# Patient Record
Sex: FEMALE | Race: WHITE | NOT HISPANIC OR LATINO | Employment: FULL TIME | URBAN - METROPOLITAN AREA
[De-identification: names, ages, dates, MRNs, and addresses within clinical notes are randomized per-mention and may not be internally consistent; named-entity substitution may affect disease eponyms.]

---

## 2017-01-10 ENCOUNTER — ALLSCRIPTS OFFICE VISIT (OUTPATIENT)
Dept: OTHER | Facility: OTHER | Age: 52
End: 2017-01-10

## 2017-01-20 ENCOUNTER — ALLSCRIPTS OFFICE VISIT (OUTPATIENT)
Dept: OTHER | Facility: OTHER | Age: 52
End: 2017-01-20

## 2017-03-31 ENCOUNTER — ALLSCRIPTS OFFICE VISIT (OUTPATIENT)
Dept: OTHER | Facility: OTHER | Age: 52
End: 2017-03-31

## 2017-03-31 DIAGNOSIS — N95.0 POSTMENOPAUSAL BLEEDING: ICD-10-CM

## 2017-04-04 ENCOUNTER — HOSPITAL ENCOUNTER (OUTPATIENT)
Dept: RADIOLOGY | Facility: HOSPITAL | Age: 52
Discharge: HOME | End: 2017-04-04
Payer: COMMERCIAL

## 2017-04-04 DIAGNOSIS — N95.0 POSTMENOPAUSAL BLEEDING: ICD-10-CM

## 2017-04-04 PROCEDURE — 76856 US EXAM PELVIC COMPLETE: CPT

## 2017-04-04 PROCEDURE — 76830 TRANSVAGINAL US NON-OB: CPT

## 2017-04-12 ENCOUNTER — GENERIC CONVERSION - ENCOUNTER (OUTPATIENT)
Dept: OTHER | Facility: OTHER | Age: 52
End: 2017-04-12

## 2017-04-28 ENCOUNTER — ALLSCRIPTS OFFICE VISIT (OUTPATIENT)
Dept: OTHER | Facility: OTHER | Age: 52
End: 2017-04-28

## 2017-04-28 ENCOUNTER — LAB REQUISITION (OUTPATIENT)
Dept: LAB | Facility: HOSPITAL | Age: 52
End: 2017-04-28
Payer: COMMERCIAL

## 2017-04-28 DIAGNOSIS — N95.0 POSTMENOPAUSAL BLEEDING: ICD-10-CM

## 2017-04-28 PROCEDURE — 88305 TISSUE EXAM BY PATHOLOGIST: CPT | Performed by: FAMILY MEDICINE

## 2017-05-05 ENCOUNTER — ALLSCRIPTS OFFICE VISIT (OUTPATIENT)
Dept: OTHER | Facility: OTHER | Age: 52
End: 2017-05-05

## 2017-09-21 ENCOUNTER — ALLSCRIPTS OFFICE VISIT (OUTPATIENT)
Dept: OTHER | Facility: OTHER | Age: 52
End: 2017-09-21

## 2018-01-12 VITALS
RESPIRATION RATE: 16 BRPM | WEIGHT: 183.4 LBS | DIASTOLIC BLOOD PRESSURE: 80 MMHG | SYSTOLIC BLOOD PRESSURE: 130 MMHG | TEMPERATURE: 97.4 F | HEIGHT: 64 IN | BODY MASS INDEX: 31.31 KG/M2 | HEART RATE: 88 BPM

## 2018-01-12 VITALS
HEIGHT: 64 IN | RESPIRATION RATE: 16 BRPM | TEMPERATURE: 97.3 F | WEIGHT: 187.38 LBS | HEART RATE: 88 BPM | SYSTOLIC BLOOD PRESSURE: 128 MMHG | BODY MASS INDEX: 31.99 KG/M2 | DIASTOLIC BLOOD PRESSURE: 80 MMHG

## 2018-01-12 VITALS
BODY MASS INDEX: 30.8 KG/M2 | DIASTOLIC BLOOD PRESSURE: 88 MMHG | HEIGHT: 64 IN | SYSTOLIC BLOOD PRESSURE: 132 MMHG | WEIGHT: 180.4 LBS

## 2018-01-12 NOTE — RESULT NOTES
Verified Results  * US PELVIS COMPLETE (TRANSABDOMINAL AND TRANSVAGINAL) [id number] 12:18PM Claude Halt Order Number: [id number]    - Patient Instructions: To schedule this appointment, please contact Central Scheduling at [phone]  Test Name Result Flag Reference   US PELVIS COMPLETE (TRANSABDOMINAL AND TRANSVAGINAL) (Report)     PELVIC ULTRASOUND, COMPLETE     INDICATION: Postmenopausal bleeding  Patient is postmenopausal 2 years ago      COMPARISON: None  TECHNIQUE:  Transabdominal pelvic ultrasound was performed in sagittal and transverse planes with a curvilinear transducer  Additional transvaginal imaging was performed to better evaluate the endometrium and ovaries  Imaging included volumetric    sweeps as well as traditional still imaging technique  FINDINGS:     UTERUS:   The uterus is normal in position, measuring 6 9 x 3 4 x 4 3 cm  Contour and echotexture appear normal  A simple nabothian cyst is present  The cervix shows no suspicious abnormality  A fundal fibroid measures 1 3 x 1 2 x 2 1 cm  ENDOMETRIUM:    The endometrium is irregular and thickened measuring 7 mm  It is vascular  Homogenous and normal in appearance  OVARIES/ADNEXA:   Right ovary: 2 3 x 1 3 x 2 0 cm  cm  No suspicious right ovarian abnormality  Dominant simple benign appearing follicle noted  Doppler flow within normal limits  Left ovary: 2 4 x 1 0 x 1 5 cm  No suspicious left ovarian abnormality  Dominant simple benign appearing follicle noted  Doppler flow within normal limits  No suspicious adnexal mass or loculated collections  There is no free fluid  IMPRESSION:      Thickened heterogeneous and vascular endometrium  Endometrial biopsy recommended  A fibroid is present  Bilateral adnexal follicles          ##sigslh##sigslh       Workstation performed: [id number]     Signed by:   Christopher Looney MD   4/5/17

## 2018-01-13 VITALS
HEART RATE: 88 BPM | DIASTOLIC BLOOD PRESSURE: 80 MMHG | WEIGHT: 186 LBS | SYSTOLIC BLOOD PRESSURE: 130 MMHG | HEIGHT: 64 IN | TEMPERATURE: 97.1 F | BODY MASS INDEX: 31.76 KG/M2 | RESPIRATION RATE: 16 BRPM

## 2018-01-13 VITALS
WEIGHT: 179.13 LBS | SYSTOLIC BLOOD PRESSURE: 129 MMHG | HEART RATE: 102 BPM | BODY MASS INDEX: 30.75 KG/M2 | DIASTOLIC BLOOD PRESSURE: 84 MMHG

## 2018-01-13 VITALS
HEART RATE: 67 BPM | BODY MASS INDEX: 31.41 KG/M2 | SYSTOLIC BLOOD PRESSURE: 124 MMHG | WEIGHT: 183 LBS | DIASTOLIC BLOOD PRESSURE: 82 MMHG

## 2018-01-13 NOTE — MISCELLANEOUS
Message   Recorded as Task   Date: 04/12/2017 08:52 AM, Created By: Madalyn Umanzor   Task Name: Follow Up   Assigned To: Jesse Tao   Regarding Patient: Candice Romo, Status: Active   Comment:    Madalyn Umanzor - 12 Apr 2017 8:52 AM     TASK CREATED  please  verify pelvic u s    pt is calling for results of u s & recommendation is to have an endo  bx  please advise  thank you Eyal Brewster - 12 Apr 2017 2:38 PM     TASK REPLIED TO: Previously Assigned To Colton Bud  Verified results  Please call patient to schedule appointment for endometrial biopsy  Thank you  Jesse Tao - 12 Apr 2017 2:47 PM     TASK EDITED  called pt-notified of recommendation - apt scheduled for endometrial bx on 4 /28/17 at 1015 am   pt verbalized understanding  Active Problems    1  Benign hypertension (401 1) (I10)   2  Depression (311) (F32 9)   3  Encounter for screening for malignant neoplasm of colon (V76 51) (Z12 11)   4  Encounter for screening mammogram for malignant neoplasm of breast (V76 12)   (Z12 31)   5  Hyperlipidemia (272 4) (E78 5)   6  Insomnia (780 52) (G47 00)   7  Other specified anxiety disorders (300 09) (F41 8)   8  Postmenopausal bleeding (627 1) (N95 0)   9  Vitamin D deficiency (268 9) (E55 9)   10  Well adult on routine health check (V70 0) (Z00 00)    Current Meds   1  ALPRAZolam 0 5 MG Oral Tablet; ONE TAB PO BID PRN; Therapy: 61WKH6591 to (Last Rx:55Okz5148)  Requested for: 31Nhk9249 Ordered   2  BuPROPion HCl ER (XL) 300 MG Oral Tablet Extended Release 24 Hour; Therapy: 35OOV3140 to Recorded   3  HydroCHLOROthiazide 12 5 MG Oral Tablet; 1 every day; Therapy: 74DEM8258 to (Last OO:05ADT5849)  Requested for: 82GNR2965 Ordered   4  Lexapro 10 MG Oral Tablet (Escitalopram Oxalate); Therapy: 60MCH2968 to Recorded   5  Metoprolol Succinate ER 25 MG Oral Tablet Extended Release 24 Hour; 1 every day; Therapy: 27FHS3253 to (Last Rx:08Mar2017)  Requested for: 73KSC8364 Ordered   6  Ondansetron HCl - 4 MG Oral Tablet; 1 tablet every 4 hours as needed; Therapy: 80UUA9964 to (Last Rx:20Jan2017)  Requested for: 20Jan2017 Ordered   7  Vytorin 10-20 MG Oral Tablet; Take 1 tablet daily; Therapy: 21Twt2169 to (Last Rx:35Iyh5560)  Requested for: 74Vyp3682 Ordered    Allergies    1   PREDNISONE    Signatures   Electronically signed by : Keyla Cisneros RN; Apr 12 2017  2:47PM EST                       (Author)

## 2018-01-16 NOTE — RESULT NOTES
Verified Results  (1) COMPREHENSIVE METABOLIC PANEL [id number] 32:13ZH [facility]     Test Name Result Flag Reference   GLUCOSE 84 mg/dL  65-99   Fasting reference interval   UREA NITROGEN (BUN) 11 mg/dL  7-25   CREATININE 1 08 mg/dL H 0 50-1 05   For patients >52years of age, the reference limit  for Creatinine is approximately 13% higher for people  identified as -American  eGFR NON-AFR  AMERICAN 59 mL/min/1 73m2 L > OR = 60   eGFR AFRICAN AMERICAN 68 mL/min/1 73m2  > OR = 60   BUN/CREATININE RATIO 10 (calc)  6-22   SODIUM 141 mmol/L  135-146   POTASSIUM 4 6 mmol/L  3 5-5 3   CHLORIDE 106 mmol/L     CARBON DIOXIDE 24 mmol/L  20-31   CALCIUM 9 6 mg/dL  8 6-10 4   PROTEIN, TOTAL 7 2 g/dL  6 1-8 1   ALBUMIN 4 4 g/dL  3 6-5 1   GLOBULIN 2 8 g/dL (calc)  1 9-3 7   ALBUMIN/GLOBULIN RATIO 1 6 (calc)  1 0-2 5   BILIRUBIN, TOTAL 0 4 mg/dL  0 2-1 2   ALKALINE PHOSPHATASE 103 U/L     AST 22 U/L  10-35   ALT 17 U/L  6-29     (1) LIPID PANEL, FASTING 93Lsv3020 09:50AM [facility]     Test Name Result Flag Reference   CHOLESTEROL, TOTAL 224 mg/dL H 125-200   HDL CHOLESTEROL 41 mg/dL L > OR = 46   TRIGLICERIDES 382 mg/dL H <150   LDL-CHOLESTEROL 135 mg/dL (calc) H <130   Desirable range <100 mg/dL for patients with CHD or  diabetes and <70 mg/dL for diabetic patients with  known heart disease  CHOL/HDLC RATIO 5 5 (calc) H < OR = 5 0   NON HDL CHOLESTEROL 183 mg/dL (calc) H    Target for non-HDL cholesterol is 30 mg/dL higher than   LDL cholesterol target

## 2018-01-16 NOTE — MISCELLANEOUS
Message  pt called office -explained to pt awaiting dr Emily Coates to verify u s  report, then will call with f/u recommendation  Active Problems    1  Benign hypertension (401 1) (I10)   2  Depression (311) (F32 9)   3  Encounter for screening for malignant neoplasm of colon (V76 51) (Z12 11)   4  Encounter for screening mammogram for malignant neoplasm of breast (V76 12)   (Z12 31)   5  Hyperlipidemia (272 4) (E78 5)   6  Insomnia (780 52) (G47 00)   7  Other specified anxiety disorders (300 09) (F41 8)   8  Postmenopausal bleeding (627 1) (N95 0)   9  Vitamin D deficiency (268 9) (E55 9)   10  Well adult on routine health check (V70 0) (Z00 00)    Current Meds   1  ALPRAZolam 0 5 MG Oral Tablet; ONE TAB PO BID PRN; Therapy: 35UDQ9633 to (Last Rx:34Vff9631)  Requested for: 50Jrw3905 Ordered   2  BuPROPion HCl ER (XL) 300 MG Oral Tablet Extended Release 24 Hour; Therapy: 81NWK2440 to Recorded   3  HydroCHLOROthiazide 12 5 MG Oral Tablet; 1 every day; Therapy: 06ELC5655 to (Last RH:04ABV3608)  Requested for: 21XST7916 Ordered   4  Lexapro 10 MG Oral Tablet (Escitalopram Oxalate); Therapy: 85DOO4604 to Recorded   5  Metoprolol Succinate ER 25 MG Oral Tablet Extended Release 24 Hour; 1 every day; Therapy: 69ZBG4885 to (Last Rx:08Mar2017)  Requested for: 01NSY3576 Ordered   6  Ondansetron HCl - 4 MG Oral Tablet; 1 tablet every 4 hours as needed; Therapy: 39MSI2329 to (Last Rx:20Jan2017)  Requested for: 20Jan2017 Ordered   7  Vytorin 10-20 MG Oral Tablet; Take 1 tablet daily; Therapy: 69Dut5818 to (Last Rx:21Gfr5369)  Requested for: 29Fsn0033 Ordered    Allergies    1   PREDNISONE    Signatures   Electronically signed by : Alexis Graham RN; Apr 12 2017 10:09AM EST                       (Author)

## 2018-03-05 ENCOUNTER — OFFICE VISIT (OUTPATIENT)
Dept: URGENT CARE | Facility: CLINIC | Age: 53
End: 2018-03-05
Payer: COMMERCIAL

## 2018-03-05 VITALS
HEART RATE: 73 BPM | OXYGEN SATURATION: 98 % | BODY MASS INDEX: 29.89 KG/M2 | TEMPERATURE: 98 F | DIASTOLIC BLOOD PRESSURE: 70 MMHG | RESPIRATION RATE: 16 BRPM | WEIGHT: 186 LBS | HEIGHT: 66 IN | SYSTOLIC BLOOD PRESSURE: 108 MMHG

## 2018-03-05 DIAGNOSIS — J06.9 ACUTE URI: Primary | ICD-10-CM

## 2018-03-05 PROCEDURE — 99213 OFFICE O/P EST LOW 20 MIN: CPT | Performed by: PHYSICIAN ASSISTANT

## 2018-03-05 RX ORDER — BUPROPION HYDROCHLORIDE 300 MG/1
300 TABLET ORAL EVERY MORNING
COMMUNITY
Start: 2015-01-12 | End: 2020-02-26 | Stop reason: SDUPTHER

## 2018-03-05 RX ORDER — EZETIMIBE AND SIMVASTATIN 10; 20 MG/1; MG/1
1 TABLET ORAL DAILY
COMMUNITY
Start: 2016-08-30 | End: 2018-05-07 | Stop reason: SDUPTHER

## 2018-03-05 RX ORDER — METOPROLOL SUCCINATE 25 MG/1
25 TABLET, EXTENDED RELEASE ORAL
COMMUNITY
Start: 2016-01-05 | End: 2018-12-21 | Stop reason: SDUPTHER

## 2018-03-05 RX ORDER — ALPRAZOLAM 0.5 MG/1
1 TABLET ORAL 2 TIMES DAILY PRN
COMMUNITY
Start: 2012-10-23 | End: 2018-08-20 | Stop reason: SDUPTHER

## 2018-03-05 RX ORDER — ESCITALOPRAM OXALATE 10 MG/1
10 TABLET ORAL
COMMUNITY
Start: 2015-01-12 | End: 2020-02-26

## 2018-03-05 NOTE — PROGRESS NOTES
[facility]        NAME: Julian Bragg is a 45 y o  female  : 1965    MRN: [id number]  DATE: 2018  TIME: 9:24 AM    Assessment and Plan   Acute URI [J06 9]  1  Acute URI           Patient Instructions   Acute Upper Respiratory Tract Infection:   -There is no sign of bacterial infection on exam at this time  This is likely a viral illness  Advised supportive measures  -You can use OTC zyrtec or claritin  You can OTC mucinex  -Use a humidifier next to your bed  Take steam showers  -You can take Advil or Tylenol for fever or pain  -Stay very well hydrated and rest   -If your symptoms persist or worsen follow up immediately with your PCP      Follow up with PCP in 3-5 days  Proceed to  ER if symptoms worsen  Chief Complaint     Chief Complaint   Patient presents with    Cold Like Symptoms     bodyaches, fatigue,nausea,decreased appetite; onset Saturday 3/3/18         History of Present Illness       The patient presents today for a two day hx of fatigue, myalgias, chills  She reports that her symptoms started with fatigue and then progressed to myalgias and chills  She did not take her temperature  She also notes that she is experiencing nausea and decreased appetite  She states that she is now having PND and rhinorrhea  She has been tolerating liquid PO intake  She has been taking tylenol for her symptoms with little relief  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  She did not have her flu vaccine this year  She denies vomiting, diarrhea, abdominal pain, cough, dyspnea, wheezing, pharyngitis  Review of Systems   Review of Systems   Constitutional: Positive for appetite change, chills and fatigue  Negative for fever  HENT: Positive for postnasal drip and rhinorrhea  Negative for congestion, ear discharge, ear pain, sinus pain, sinus pressure, sore throat, tinnitus, trouble swallowing and voice change      Respiratory: Negative for cough, chest tightness, shortness of breath and wheezing  Cardiovascular: Negative for chest pain, palpitations and leg swelling  Gastrointestinal: Positive for nausea  Negative for abdominal distention, abdominal pain, anal bleeding, blood in stool, constipation, diarrhea, rectal pain and vomiting  Musculoskeletal: Positive for myalgias  Negative for neck pain and neck stiffness  Neurological: Negative for dizziness, light-headedness and headaches  Hematological: Negative for adenopathy  Does not bruise/bleed easily  Current Medications       Current Outpatient Prescriptions:     ALPRAZolam (XANAX) 0 5 mg tablet, Take 1 tablet by mouth 2 (two) times a day as needed, Disp: , Rfl:     buPROPion (WELLBUTRIN XL) 300 mg 24 hr tablet, Take by mouth, Disp: , Rfl:     escitalopram (LEXAPRO) 10 mg tablet, Take by mouth, Disp: , Rfl:     ezetimibe-simvastatin (VYTORIN) 10-20 mg per tablet, Take 1 tablet by mouth daily, Disp: , Rfl:     metoprolol succinate (TOPROL-XL) 25 mg 24 hr tablet, Take by mouth daily, Disp: , Rfl:     Current Allergies     Allergies as of 03/05/2018 - Reviewed 03/05/2018   Allergen Reaction Noted    Prednisone Anxiety 02/14/2012            The following portions of the patient's history were reviewed and updated as appropriate: allergies, current medications, past family history, past medical history, past social history, past surgical history and problem list      Past Medical History:   Diagnosis Date    Depression     Hyperlipidemia     Hypertension        History reviewed  No pertinent surgical history  Family History   Problem Relation Age of Onset    No Known Problems Mother     No Known Problems Father          Medications have been verified  Objective   /70   Pulse 73   Temp 98 °F (36 7 °C)   Resp 16   Ht 5' 5 5" (1 664 m)   Wt 84 4 kg (186 lb)   SpO2 98%   Breastfeeding?  No   BMI 30 48 kg/m²        Physical Exam     Physical Exam   Constitutional: She is oriented to person, place, and time  She appears well-developed and well-nourished  No distress  HENT:   Right Ear: Hearing, tympanic membrane, external ear and ear canal normal    Left Ear: Hearing, tympanic membrane, external ear and ear canal normal    Nose: Nose normal    Mouth/Throat: Uvula is midline, oropharynx is clear and moist and mucous membranes are normal  No oropharyngeal exudate, posterior oropharyngeal edema, posterior oropharyngeal erythema or tonsillar abscesses  Neck: Normal range of motion  Neck supple  Cardiovascular: Normal rate, regular rhythm and normal heart sounds  Exam reveals no gallop and no friction rub  No murmur heard  Pulmonary/Chest: Effort normal and breath sounds normal  No respiratory distress  She has no wheezes  She has no rales  She exhibits no tenderness  Abdominal: Soft  Bowel sounds are normal  There is no tenderness  Lymphadenopathy:     She has no cervical adenopathy  Neurological: She is alert and oriented to person, place, and time  Psychiatric: She has a normal mood and affect

## 2018-03-05 NOTE — PATIENT INSTRUCTIONS
Upper Respiratory Infection   AMBULATORY CARE:   An upper respiratory infection  is also called a common cold  It can affect your nose, throat, ears, and sinuses  Common signs and symptoms include the following:  Cold symptoms are usually worst for the first 3 to 5 days  You may have any of the following:  · Runny or stuffy nose    · Sneezing and coughing    · Sore throat or hoarseness    · Red, watery, and sore eyes    · Fatigue     · Chills and fever    · Headache, body aches, or sore muscles  Seek care immediately if:   · You have chest pain or trouble breathing  Contact your healthcare provider if:   · You have a fever over 102ºF (39°C)  · Your sore throat gets worse or you see white or yellow spots in your throat  · Your symptoms get worse after 3 to 5 days or your cold is not better in 14 days  · You have a rash anywhere on your skin  · You have large, tender lumps in your neck  · You have thick, green or yellow drainage from your nose  · You cough up thick yellow, green, or bloody mucus  · You have vomiting for more than 24 hours and cannot keep fluids down  · You have a bad earache  · You have questions or concerns about your condition or care  Treatment for a cold: There is no cure for the common cold  Colds are caused by viruses and do not get better with antibiotics  Most people get better in 7 to 14 days  You may continue to cough for 2 to 3 weeks  The following may help decrease your symptoms:  · Decongestants  help reduce nasal congestion and help you breathe more easily  If you take decongestant pills, they may make you feel restless or not able to sleep  Do not use decongestant sprays for more than a few days  · Cough suppressants  help reduce coughing  Ask your healthcare provider which type of cough medicine is best for you  · NSAIDs , such as ibuprofen, help decrease swelling, pain, and fever   NSAIDs can cause stomach bleeding or kidney problems in certain people  If you take blood thinner medicine, always ask your healthcare provider if NSAIDs are safe for you  Always read the medicine label and follow directions  · Acetaminophen  decreases pain and fever  It is available without a doctor's order  Ask how much to take and how often to take it  Follow directions  Read the labels of all other medicines you are using to see if they also contain acetaminophen, or ask your doctor or pharmacist  Acetaminophen can cause liver damage if not taken correctly  Do not use more than 4 grams (4,000 milligrams) total of acetaminophen in one day  Manage your cold:   · Rest as much as possible  Slowly start to do more each day  · Drink more liquids as directed  Liquids will help thin and loosen mucus so you can cough it up  Liquids will also help prevent dehydration  Liquids that help prevent dehydration include water, fruit juice, and broth  Do not drink liquids that contain caffeine  Caffeine can increase your risk for dehydration  Ask your healthcare provider how much liquid to drink each day  · Soothe a sore throat  Gargle with warm salt water  This helps your sore throat feel better  Make salt water by dissolving ¼ teaspoon salt in 1 cup warm water  You may also suck on hard candy or throat lozenges  You may use a sore throat spray  · Use a humidifier or vaporizer  Use a cool mist humidifier or a vaporizer to increase air moisture in your home  This may make it easier for you to breathe and help decrease your cough  · Use saline nasal drops as directed  These help relieve congestion  · Apply petroleum-based jelly around the outside of your nostrils  This can decrease irritation from blowing your nose  · Do not smoke  Nicotine and other chemicals in cigarettes and cigars can make your symptoms worse  They can also cause infections such as bronchitis or pneumonia   Ask your healthcare provider for information if you currently smoke and need help to quit  E-cigarettes or smokeless tobacco still contain nicotine  Talk to your healthcare provider before you use these products  Prevent spreading your cold to others:   · Try to stay away from other people during the first 2 to 3 days of your cold when it is more easily spread  · Do not share food or drinks  · Do not share hand towels with household members  · Wash your hands often, especially after you blow your nose  Turn away from other people and cover your mouth and nose with a tissue when you sneeze or cough  Follow up with your healthcare provider as directed:  Write down your questions so you remember to ask them during your visits  © 2017 [employer]  Information is for End User's use only and may not be sold, redistributed or otherwise used for commercial purposes  All illustrations and images included in CareNotes® are the copyrighted property of [employer]  or [employer] Fu[employer]  The above information is an  only  It is not intended as medical advice for individual conditions or treatments  Talk to your doctor, nurse or pharmacist before following any medical regimen to see if it is safe and effective for you  Acute Upper Respiratory Tract Infection:   -There is no sign of bacterial infection on exam at this time  This is likely a viral illness  Advised supportive measures  -You can use OTC zyrtec or claritin  You can OTC mucinex  -Use a humidifier next to your bed  Take steam showers     -You can take Advil or Tylenol for fever or pain  -Stay very well hydrated and rest   -If your symptoms persist or worsen follow up immediately with your PCP

## 2018-03-05 NOTE — LETTER
March 5, 2018     Patient: Shala Alexis   YOB: 1965   Date of Visit: 3/5/2018       To Whom It May Concern: It is my medical opinion that Shala Alexis was seen in my office on 3/5/2018  She will need to be excused from work 3/4/18-3/6/18 and may return to work on 3/7/18  If you have any questions or concerns, please don't hesitate to call           Sincerely,        Garett Ramirez PA-C    CC: Shala Alexis

## 2018-05-04 ENCOUNTER — OFFICE VISIT (OUTPATIENT)
Dept: FAMILY MEDICINE CLINIC | Facility: CLINIC | Age: 53
End: 2018-05-04
Payer: COMMERCIAL

## 2018-05-04 VITALS
SYSTOLIC BLOOD PRESSURE: 125 MMHG | BODY MASS INDEX: 30.37 KG/M2 | HEIGHT: 66 IN | HEART RATE: 88 BPM | TEMPERATURE: 98.2 F | RESPIRATION RATE: 16 BRPM | WEIGHT: 189 LBS | DIASTOLIC BLOOD PRESSURE: 90 MMHG

## 2018-05-04 DIAGNOSIS — Z12.39 SCREENING FOR BREAST CANCER: ICD-10-CM

## 2018-05-04 DIAGNOSIS — E55.9 VITAMIN D DEFICIENCY: ICD-10-CM

## 2018-05-04 DIAGNOSIS — I10 BENIGN HYPERTENSION: ICD-10-CM

## 2018-05-04 DIAGNOSIS — Z12.11 SCREENING FOR COLON CANCER: ICD-10-CM

## 2018-05-04 DIAGNOSIS — H26.9 CATARACT OF BOTH EYES, UNSPECIFIED CATARACT TYPE: ICD-10-CM

## 2018-05-04 DIAGNOSIS — F41.8 OTHER SPECIFIED ANXIETY DISORDERS: ICD-10-CM

## 2018-05-04 DIAGNOSIS — E78.5 HYPERLIPIDEMIA, UNSPECIFIED HYPERLIPIDEMIA TYPE: ICD-10-CM

## 2018-05-04 DIAGNOSIS — Z01.818 PREOP EXAMINATION: Primary | ICD-10-CM

## 2018-05-04 PROBLEM — J06.9 ACUTE URI: Status: RESOLVED | Noted: 2018-03-05 | Resolved: 2018-05-04

## 2018-05-04 PROCEDURE — 99243 OFF/OP CNSLTJ NEW/EST LOW 30: CPT | Performed by: FAMILY MEDICINE

## 2018-05-04 NOTE — PROGRESS NOTES
Chief Complaint   Patient presents with    Cataract     bilateral   Selena Lon Collins 5/15         Patient ID: Clover Height is a 45 y o  female  HPI  Pt is seeing for preop exam prior to eye surgery -  Was recently Dx with rapidly progressing b/l cataracts     The following portions of the patient's history were reviewed and updated as appropriate: allergies, current medications, past family history, past medical history, past social history, past surgical history and problem list     Review of Systems   Constitutional: Negative  Eyes: Positive for visual disturbance  Negative for pain and discharge  Respiratory: Negative  Cardiovascular: Negative  Gastrointestinal: Negative  Genitourinary: Negative  Musculoskeletal: Negative  Skin: Negative  Neurological: Negative  Current Outpatient Prescriptions   Medication Sig Dispense Refill    ALPRAZolam (XANAX) 0 5 mg tablet Take 1 tablet by mouth 2 (two) times a day as needed      buPROPion (WELLBUTRIN XL) 300 mg 24 hr tablet Take by mouth      escitalopram (LEXAPRO) 10 mg tablet Take by mouth      fexofenadine (ALLEGRA) 30 MG tablet Take 30 mg by mouth 2 (two) times a day      metoprolol succinate (TOPROL-XL) 25 mg 24 hr tablet Take by mouth daily      ezetimibe-simvastatin (VYTORIN) 10-20 mg per tablet Take 1 tablet by mouth daily       No current facility-administered medications for this visit  Objective:    /90 (BP Location: Left arm, Patient Position: Sitting, Cuff Size: Standard)   Pulse 88   Temp 98 2 °F (36 8 °C) (Tympanic)   Resp 16   Ht 5' 5 5" (1 664 m)   Wt 85 7 kg (189 lb)   BMI 30 97 kg/m²        Physical Exam   Constitutional: She is oriented to person, place, and time  She appears well-nourished  No distress  HENT:   Head: Normocephalic  Mouth/Throat: Oropharynx is clear and moist    Eyes: Conjunctivae are normal    Neck: Neck supple  No JVD present  No thyromegaly present  Cardiovascular: Normal rate, regular rhythm and normal heart sounds  Exam reveals no gallop  No murmur heard  Pulmonary/Chest: Effort normal and breath sounds normal  No respiratory distress  She has no wheezes  She has no rales  Abdominal: Soft  There is no tenderness  Musculoskeletal: She exhibits no edema or tenderness  Neurological: She is oriented to person, place, and time  No cranial nerve deficit  Skin: Skin is warm  No erythema  Labs in chart were reviewed  Assessment/Plan:         Diagnoses and all orders for this visit:    Benign hypertension    Hyperlipidemia, unspecified hyperlipidemia type    Other specified anxiety disorders    Vitamin D deficiency    Preop examination  clear for surgery   Cataract of both eyes, unspecified cataract type    Other orders  -     fexofenadine (ALLEGRA) 30 MG tablet; Take 30 mg by mouth 2 (two) times a day  1  Preop examination     2  Benign hypertension  Comprehensive metabolic panel    TSH, 3rd generation    Hemoglobin A1c    Comprehensive metabolic panel    TSH, 3rd generation    Hemoglobin A1c   3  Hyperlipidemia, unspecified hyperlipidemia type  Lipid panel    Lipid panel   4  Other specified anxiety disorders     5  Vitamin D deficiency  Vitamin D 25 hydroxy    Vitamin D 25 hydroxy   6  Cataract of both eyes, unspecified cataract type     7  Screening for breast cancer  Mammo screening bilateral w cad   8   Screening for colon cancer  Ambulatory referral to Gastroenterology             rto prselina Altman MD

## 2018-05-07 DIAGNOSIS — E78.00 PURE HYPERCHOLESTEROLEMIA: Primary | ICD-10-CM

## 2018-05-07 RX ORDER — EZETIMIBE AND SIMVASTATIN 10; 20 MG/1; MG/1
1 TABLET ORAL DAILY
Qty: 90 TABLET | Refills: 2 | Status: SHIPPED | OUTPATIENT
Start: 2018-05-07 | End: 2020-02-26

## 2018-05-12 LAB
25(OH)D3 SERPL-MCNC: 36 NG/ML (ref 30–100)
ALBUMIN SERPL-MCNC: 4.5 G/DL (ref 3.6–5.1)
ALBUMIN/GLOB SERPL: 1.9 (CALC) (ref 1–2.5)
ALP SERPL-CCNC: 102 U/L (ref 33–130)
ALT SERPL-CCNC: 17 U/L (ref 6–29)
AST SERPL-CCNC: 19 U/L (ref 10–35)
BILIRUB SERPL-MCNC: 0.4 MG/DL (ref 0.2–1.2)
BUN SERPL-MCNC: 10 MG/DL (ref 7–25)
BUN/CREAT SERPL: 9 (CALC) (ref 6–22)
CALCIUM SERPL-MCNC: 9.2 MG/DL (ref 8.6–10.4)
CHLORIDE SERPL-SCNC: 105 MMOL/L (ref 98–110)
CHOLEST SERPL-MCNC: 159 MG/DL
CHOLEST/HDLC SERPL: 3.4 (CALC)
CO2 SERPL-SCNC: 30 MMOL/L (ref 20–31)
CREAT SERPL-MCNC: 1.07 MG/DL (ref 0.5–1.05)
GLOBULIN SER CALC-MCNC: 2.4 G/DL (CALC) (ref 1.9–3.7)
GLUCOSE SERPL-MCNC: 91 MG/DL (ref 65–99)
HBA1C MFR BLD: 5.4 % OF TOTAL HGB
HDLC SERPL-MCNC: 47 MG/DL
LDLC SERPL CALC-MCNC: 89 MG/DL (CALC)
NONHDLC SERPL-MCNC: 112 MG/DL (CALC)
POTASSIUM SERPL-SCNC: 4.2 MMOL/L (ref 3.5–5.3)
PROT SERPL-MCNC: 6.9 G/DL (ref 6.1–8.1)
SL AMB EGFR AFRICAN AMERICAN: 68 ML/MIN/1.73M2
SL AMB EGFR NON AFRICAN AMERICAN: 59 ML/MIN/1.73M2
SODIUM SERPL-SCNC: 140 MMOL/L (ref 135–146)
TRIGL SERPL-MCNC: 132 MG/DL
TSH SERPL-ACNC: 1.6 MIU/L

## 2018-05-14 ENCOUNTER — TELEPHONE (OUTPATIENT)
Dept: FAMILY MEDICINE CLINIC | Facility: CLINIC | Age: 53
End: 2018-05-14

## 2018-05-14 NOTE — TELEPHONE ENCOUNTER
----- Message from Yudi lAmaraz MD sent at 5/14/2018  7:36 AM EDT -----  Pl, advise pt -  All labs are normal

## 2018-06-06 ENCOUNTER — OFFICE VISIT (OUTPATIENT)
Dept: FAMILY MEDICINE CLINIC | Facility: CLINIC | Age: 53
End: 2018-06-06
Payer: COMMERCIAL

## 2018-06-06 VITALS
SYSTOLIC BLOOD PRESSURE: 120 MMHG | DIASTOLIC BLOOD PRESSURE: 85 MMHG | RESPIRATION RATE: 16 BRPM | BODY MASS INDEX: 30.22 KG/M2 | WEIGHT: 188 LBS | TEMPERATURE: 98.6 F | HEIGHT: 66 IN | HEART RATE: 64 BPM

## 2018-06-06 DIAGNOSIS — H26.9 CATARACT OF RIGHT EYE, UNSPECIFIED CATARACT TYPE: ICD-10-CM

## 2018-06-06 DIAGNOSIS — Z01.818 PREOP EXAMINATION: Primary | ICD-10-CM

## 2018-06-06 DIAGNOSIS — I10 BENIGN HYPERTENSION: ICD-10-CM

## 2018-06-06 PROCEDURE — 99243 OFF/OP CNSLTJ NEW/EST LOW 30: CPT | Performed by: FAMILY MEDICINE

## 2018-06-06 NOTE — PROGRESS NOTES
Chief Complaint   Patient presents with    Pre-op Exam     right eye (Dr Holli Cisneros) June 19        Patient ID: Gianluca Penn is a 45 y o  female  HPI  Pt is seeing for preop exam prior to eye surgery -  Was recently Dx with rapidly progressing b/l cataracts  -  Underwent L eye surgery with good results < 1 m ago     The following portions of the patient's history were reviewed and updated as appropriate: allergies, current medications, past family history, past medical history, past social history, past surgical history and problem list     Review of Systems   Constitutional: Negative  Eyes: Positive for visual disturbance  Respiratory: Negative  Cardiovascular: Negative  Gastrointestinal: Negative  Genitourinary: Negative  Musculoskeletal: Negative  Skin: Negative  Neurological: Negative  Current Outpatient Prescriptions   Medication Sig Dispense Refill    ALPRAZolam (XANAX) 0 5 mg tablet Take 1 tablet by mouth 2 (two) times a day as needed      buPROPion (WELLBUTRIN XL) 300 mg 24 hr tablet Take by mouth      escitalopram (LEXAPRO) 10 mg tablet Take by mouth      ezetimibe-simvastatin (VYTORIN) 10-20 mg per tablet Take 1 tablet by mouth daily 90 tablet 2    fexofenadine (ALLEGRA) 30 MG tablet Take 30 mg by mouth 2 (two) times a day      metoprolol succinate (TOPROL-XL) 25 mg 24 hr tablet Take by mouth daily       No current facility-administered medications for this visit  Objective:    /85 (BP Location: Right arm, Patient Position: Sitting, Cuff Size: Large)   Pulse 64   Temp 98 6 °F (37 °C) (Tympanic)   Resp 16   Ht 5' 5 5" (1 664 m)   Wt 85 3 kg (188 lb)   BMI 30 81 kg/m²        Physical Exam   Constitutional: She is oriented to person, place, and time  She appears well-nourished  No distress  HENT:   Head: Normocephalic  Mouth/Throat: Oropharynx is clear and moist    Eyes: Conjunctivae are normal    Neck: Neck supple  No JVD present   No thyromegaly present  Cardiovascular: Normal rate, regular rhythm and normal heart sounds  Exam reveals no gallop  No murmur heard  Pulmonary/Chest: Effort normal and breath sounds normal  No respiratory distress  She has no wheezes  She has no rales  Abdominal: Soft  There is no tenderness  Musculoskeletal: She exhibits no edema or tenderness  Neurological: She is oriented to person, place, and time  No cranial nerve deficit  Skin: Skin is warm  No erythema  Labs in chart were reviewed        Assessment/Plan:         Diagnoses and all orders for this visit:    Preop examination  Pt is clear for surgery   Cataract of right eye, unspecified cataract type    Benign hypertension        rto in 6 m                     Ryan Baker MD

## 2018-08-03 ENCOUNTER — OFFICE VISIT (OUTPATIENT)
Dept: GASTROENTEROLOGY | Facility: CLINIC | Age: 53
End: 2018-08-03

## 2018-08-03 VITALS
SYSTOLIC BLOOD PRESSURE: 108 MMHG | HEIGHT: 65 IN | TEMPERATURE: 98.9 F | WEIGHT: 191.2 LBS | HEART RATE: 63 BPM | DIASTOLIC BLOOD PRESSURE: 82 MMHG | BODY MASS INDEX: 31.86 KG/M2

## 2018-08-03 DIAGNOSIS — Z12.11 ENCOUNTER FOR SCREENING COLONOSCOPY: ICD-10-CM

## 2018-08-03 DIAGNOSIS — Z83.71 FAMILY HISTORY OF COLONIC POLYPS: Primary | ICD-10-CM

## 2018-08-03 DIAGNOSIS — Z12.11 SCREENING FOR COLON CANCER: ICD-10-CM

## 2018-08-03 PROBLEM — Z83.719 FAMILY HISTORY OF COLONIC POLYPS: Status: ACTIVE | Noted: 2018-08-03

## 2018-08-03 PROCEDURE — 99203 OFFICE O/P NEW LOW 30 MIN: CPT | Performed by: INTERNAL MEDICINE

## 2018-08-03 RX ORDER — ALPRAZOLAM 0.5 MG/1
TABLET, EXTENDED RELEASE ORAL AS NEEDED
COMMUNITY
Start: 2018-08-03 | End: 2020-02-26

## 2018-08-03 NOTE — LETTER
August 3, 2018     Erasto Loco MD  [address] [facility]    Patient: Agus Lozano   YOB: 1965   Date of Visit: 8/3/2018       Dear Dr Rowley Ast: Thank you for referring Agus Lozano to me for evaluation  Below are my notes for this consultation  If you have questions, please do not hesitate to call me  I look forward to following your patient along with you  Sincerely,        Anu William MD        CC: No Recipients  Anu William MD  8/3/2018  4:51 PM  Sign at close encounter  Consultation - [address] [facility]  Agus Lozano 1965 female         Chief Complaint:  For colonoscopy    HPI:  67-year-old female with history of hypertension, hyperlipidemia was referred for colonoscopy  Patient never had colonoscopy in the past   Her sister has history of colon polyps  Patient has regular bowel movements with occasional constipation and denies any blood or mucus in the stool  Appetite is good and denies any recent weight loss  Denies any abdominal pain, nausea, or vomiting  Has no heartburn or acid reflux  Denies any difficulty swallowing  REVIEW OF SYSTEMS: Review of Systems   Constitutional: Negative for activity change, appetite change, chills, diaphoresis, fatigue, fever and unexpected weight change  HENT: Negative for ear discharge, ear pain, facial swelling, hearing loss, nosebleeds, sore throat, tinnitus and voice change  Eyes: Negative for pain, discharge, redness, itching and visual disturbance  Respiratory: Negative for apnea, cough, chest tightness, shortness of breath and wheezing  Cardiovascular: Negative for chest pain and palpitations  Gastrointestinal:        As noted in HPI   Endocrine: Negative for cold intolerance, heat intolerance and polyuria  Genitourinary: Negative for difficulty urinating, dysuria, flank pain, hematuria and urgency     Musculoskeletal: Negative for arthralgias, back pain, gait problem, joint swelling and myalgias  Skin: Negative for rash and wound  Neurological: Negative for dizziness, tremors, seizures, speech difficulty, light-headedness, numbness and headaches  Hematological: Negative for adenopathy  Does not bruise/bleed easily  Psychiatric/Behavioral: Negative for agitation, behavioral problems and confusion  The patient is not nervous/anxious  Past Medical History:   Diagnosis Date    Arthralgia     last assessed [id number]    Asthma with acute exacerbation 12/09/2006    Depression     Discoloration of skin of lower leg     last assessed 28Oct2015    Hyperlipidemia     Hypertension     Myalgia     H/O myalgia and myositis;  last assessed [id number]    Orthostatic hypotension     last assessed [id number]    Sleep disorder 07/25/2011    H/O transient disorder of initiating or maintaining sleep    Syncope 08/10/2011      History reviewed  No pertinent surgical history  Social History     Social History    Marital status: Single     Spouse name: N/A    Number of children: N/A    Years of education: N/A     Occupational History    Not on file       Social History Main Topics    Smoking status: Never Smoker    Smokeless tobacco: Never Used      Comment: former smoker---current some day smoker --niccotene dependence, per [employer]    Alcohol use Yes      Comment: moderately    Drug use: No    Sexual activity: Not on file     Other Topics Concern    Not on file     Social History Narrative    No narrative on file     Family History   Problem Relation Age of Onset    Diabetes Mother     Hypertension Mother     Hypertension Father      Prednisone  Current Outpatient Prescriptions   Medication Sig Dispense Refill    ALPRAZolam (XANAX) 0 5 mg tablet Take 1 tablet by mouth 2 (two) times a day as needed      buPROPion (WELLBUTRIN XL) 300 mg 24 hr tablet Take by mouth      escitalopram (LEXAPRO) 10 mg tablet Take by mouth      ezetimibe-simvastatin (VYTORIN) 10-20 mg per tablet Take 1 tablet by mouth daily 90 tablet 2    fexofenadine (ALLEGRA) 30 MG tablet Take 30 mg by mouth 2 (two) times a day      metoprolol succinate (TOPROL-XL) 25 mg 24 hr tablet Take by mouth daily      ALPRAZolam ER (XANAX XR) 0 5 MG 24 hr tablet       Na Sulfate-K Sulfate-Mg Sulf (SUPREP BOWEL PREP KIT) 17 5-3 13-1 6 GM/180ML SOLN Take 2 Bottles by mouth see administration instructions Please follow the instructions from the office 2 Bottle 0     No current facility-administered medications for this visit  Blood pressure 108/82, pulse 63, temperature 98 9 °F (37 2 °C), temperature source Tympanic, height 5' 5" (1 651 m), weight 86 7 kg (191 lb 3 2 oz), not currently breastfeeding  PHYSICAL EXAM: Physical Exam   Constitutional: She is oriented to person, place, and time  She appears well-developed and well-nourished  HENT:   Head: Normocephalic and atraumatic  Nose: Nose normal    Mouth/Throat: Oropharynx is clear and moist    Eyes: Conjunctivae are normal  Right eye exhibits no discharge  Left eye exhibits no discharge  No scleral icterus  Neck: Neck supple  No JVD present  No tracheal deviation present  No thyromegaly present  Cardiovascular: Normal rate, regular rhythm and normal heart sounds  Exam reveals no gallop and no friction rub  No murmur heard  Pulmonary/Chest: Effort normal and breath sounds normal  No respiratory distress  She has no wheezes  She has no rales  She exhibits no tenderness  Abdominal: Soft  Bowel sounds are normal  She exhibits no distension and no mass  There is no tenderness  There is no rebound and no guarding  No hernia  Musculoskeletal: She exhibits no edema, tenderness or deformity  Lymphadenopathy:     She has no cervical adenopathy  Neurological: She is alert and oriented to person, place, and time  Skin: Skin is warm and dry  No rash noted  No erythema     Psychiatric: She has a normal mood and affect  Her behavior is normal  Thought content normal         No results found for: WBC, HGB, HCT, MCV, PLT  Lab Results   Component Value Date    CALCIUM 9 2 05/11/2018     08/22/2016    K 4 6 08/22/2016    CO2 24 08/22/2016     08/22/2016    BUN 10 05/11/2018    CREATININE 1 07 (H) 05/11/2018     Lab Results   Component Value Date    ALT 17 08/22/2016    AST 22 08/22/2016    ALKPHOS 103 08/22/2016    BILITOT 0 4 08/22/2016     No results found for: INR, PROTIME    Us Pelvis Complete W Transvaginal    Result Date: 4/5/2017  Impression: Thickened heterogeneous and vascular endometrium  Endometrial biopsy recommended  A fibroid is present  Bilateral adnexal follicles  ##sigslh##sigslh Workstation performed: [id number]       ASSESSMENT & PLAN:    Family history of colonic polyps  Patient is at increased risk for colon cancer screening because of family history of colon polyps in her sister     -Schedule for colonoscopy  -High-fiber diet     -Patient was given instructions about the colonoscopy prep     -Patient was explained about  the risks and benefits of the procedure  Risks including but not limited to bleeding, infection, perforation were explained in detail  Also explained about less than 100% sensitivity with the exam and other alternatives

## 2018-08-03 NOTE — PROGRESS NOTES
Consultation - [address] [facility]  Joanie Coon 1965 female         Chief Complaint:  For colonoscopy    HPI:  49-year-old female with history of hypertension, hyperlipidemia was referred for colonoscopy  Patient never had colonoscopy in the past   Her sister has history of colon polyps  Patient has regular bowel movements with occasional constipation and denies any blood or mucus in the stool  Appetite is good and denies any recent weight loss  Denies any abdominal pain, nausea, or vomiting  Has no heartburn or acid reflux  Denies any difficulty swallowing  REVIEW OF SYSTEMS: Review of Systems   Constitutional: Negative for activity change, appetite change, chills, diaphoresis, fatigue, fever and unexpected weight change  HENT: Negative for ear discharge, ear pain, facial swelling, hearing loss, nosebleeds, sore throat, tinnitus and voice change  Eyes: Negative for pain, discharge, redness, itching and visual disturbance  Respiratory: Negative for apnea, cough, chest tightness, shortness of breath and wheezing  Cardiovascular: Negative for chest pain and palpitations  Gastrointestinal:        As noted in HPI   Endocrine: Negative for cold intolerance, heat intolerance and polyuria  Genitourinary: Negative for difficulty urinating, dysuria, flank pain, hematuria and urgency  Musculoskeletal: Negative for arthralgias, back pain, gait problem, joint swelling and myalgias  Skin: Negative for rash and wound  Neurological: Negative for dizziness, tremors, seizures, speech difficulty, light-headedness, numbness and headaches  Hematological: Negative for adenopathy  Does not bruise/bleed easily  Psychiatric/Behavioral: Negative for agitation, behavioral problems and confusion  The patient is not nervous/anxious           Past Medical History:   Diagnosis Date    Arthralgia     last assessed [id number]    Asthma with acute exacerbation 12/09/2006    Depression  Discoloration of skin of lower leg     last assessed 28Oct2015    Hyperlipidemia     Hypertension     Myalgia     H/O myalgia and myositis;  last assessed [id number]    Orthostatic hypotension     last assessed [id number]    Sleep disorder 07/25/2011    H/O transient disorder of initiating or maintaining sleep    Syncope 08/10/2011      History reviewed  No pertinent surgical history  Social History     Social History    Marital status: Single     Spouse name: N/A    Number of children: N/A    Years of education: N/A     Occupational History    Not on file  Social History Main Topics    Smoking status: Never Smoker    Smokeless tobacco: Never Used      Comment: former smoker---current some day smoker --niccotene dependence, per [employer]    Alcohol use Yes      Comment: moderately    Drug use: No    Sexual activity: Not on file     Other Topics Concern    Not on file     Social History Narrative    No narrative on file     Family History   Problem Relation Age of Onset    Diabetes Mother     Hypertension Mother     Hypertension Father      Prednisone  Current Outpatient Prescriptions   Medication Sig Dispense Refill    ALPRAZolam (XANAX) 0 5 mg tablet Take 1 tablet by mouth 2 (two) times a day as needed      buPROPion (WELLBUTRIN XL) 300 mg 24 hr tablet Take by mouth      escitalopram (LEXAPRO) 10 mg tablet Take by mouth      ezetimibe-simvastatin (VYTORIN) 10-20 mg per tablet Take 1 tablet by mouth daily 90 tablet 2    fexofenadine (ALLEGRA) 30 MG tablet Take 30 mg by mouth 2 (two) times a day      metoprolol succinate (TOPROL-XL) 25 mg 24 hr tablet Take by mouth daily      ALPRAZolam ER (XANAX XR) 0 5 MG 24 hr tablet       Na Sulfate-K Sulfate-Mg Sulf (SUPREP BOWEL PREP KIT) 17 5-3 13-1 6 GM/180ML SOLN Take 2 Bottles by mouth see administration instructions Please follow the instructions from the office 2 Bottle 0     No current facility-administered medications for this visit  Blood pressure 108/82, pulse 63, temperature 98 9 °F (37 2 °C), temperature source Tympanic, height 5' 5" (1 651 m), weight 86 7 kg (191 lb 3 2 oz), not currently breastfeeding  PHYSICAL EXAM: Physical Exam   Constitutional: She is oriented to person, place, and time  She appears well-developed and well-nourished  HENT:   Head: Normocephalic and atraumatic  Nose: Nose normal    Mouth/Throat: Oropharynx is clear and moist    Eyes: Conjunctivae are normal  Right eye exhibits no discharge  Left eye exhibits no discharge  No scleral icterus  Neck: Neck supple  No JVD present  No tracheal deviation present  No thyromegaly present  Cardiovascular: Normal rate, regular rhythm and normal heart sounds  Exam reveals no gallop and no friction rub  No murmur heard  Pulmonary/Chest: Effort normal and breath sounds normal  No respiratory distress  She has no wheezes  She has no rales  She exhibits no tenderness  Abdominal: Soft  Bowel sounds are normal  She exhibits no distension and no mass  There is no tenderness  There is no rebound and no guarding  No hernia  Musculoskeletal: She exhibits no edema, tenderness or deformity  Lymphadenopathy:     She has no cervical adenopathy  Neurological: She is alert and oriented to person, place, and time  Skin: Skin is warm and dry  No rash noted  No erythema  Psychiatric: She has a normal mood and affect  Her behavior is normal  Thought content normal         No results found for: WBC, HGB, HCT, MCV, PLT  Lab Results   Component Value Date    CALCIUM 9 2 05/11/2018     08/22/2016    K 4 6 08/22/2016    CO2 24 08/22/2016     08/22/2016    BUN 10 05/11/2018    CREATININE 1 07 (H) 05/11/2018     Lab Results   Component Value Date    ALT 17 08/22/2016    AST 22 08/22/2016    ALKPHOS 103 08/22/2016    BILITOT 0 4 08/22/2016     No results found for: INR, PROTIME    Us Pelvis Complete W Transvaginal    Result Date: 4/5/2017  Impression:   Thickened heterogeneous and vascular endometrium  Endometrial biopsy recommended  A fibroid is present  Bilateral adnexal follicles  ##sigslh##sigslh Workstation performed: [id number]       ASSESSMENT & PLAN:    Family history of colonic polyps  Patient is at increased risk for colon cancer screening because of family history of colon polyps in her sister     -Schedule for colonoscopy  -High-fiber diet     -Patient was given instructions about the colonoscopy prep     -Patient was explained about  the risks and benefits of the procedure  Risks including but not limited to bleeding, infection, perforation were explained in detail  Also explained about less than 100% sensitivity with the exam and other alternatives

## 2018-08-03 NOTE — ASSESSMENT & PLAN NOTE
Patient is at increased risk for colon cancer screening because of family history of colon polyps in her sister     -Schedule for colonoscopy  -High-fiber diet     -Patient was given instructions about the colonoscopy prep     -Patient was explained about  the risks and benefits of the procedure  Risks including but not limited to bleeding, infection, perforation were explained in detail  Also explained about less than 100% sensitivity with the exam and other alternatives

## 2018-08-17 ENCOUNTER — HOSPITAL ENCOUNTER (EMERGENCY)
Facility: HOSPITAL | Age: 53
Discharge: HOME | End: 2018-08-17
Attending: EMERGENCY MEDICINE
Payer: COMMERCIAL

## 2018-08-17 VITALS
WEIGHT: 190 LBS | DIASTOLIC BLOOD PRESSURE: 73 MMHG | TEMPERATURE: 98.6 F | BODY MASS INDEX: 31.62 KG/M2 | HEART RATE: 67 BPM | SYSTOLIC BLOOD PRESSURE: 129 MMHG | OXYGEN SATURATION: 98 % | RESPIRATION RATE: 18 BRPM

## 2018-08-17 DIAGNOSIS — H81.10 VERTIGO, BENIGN POSITIONAL: Primary | ICD-10-CM

## 2018-08-17 LAB
ALBUMIN SERPL BCP-MCNC: 3.8 G/DL (ref 3.5–5)
ALP SERPL-CCNC: 97 U/L (ref 46–116)
ALT SERPL W P-5'-P-CCNC: 24 U/L (ref 12–78)
ANION GAP SERPL CALCULATED.3IONS-SCNC: 10 MMOL/L (ref 4–13)
AST SERPL W P-5'-P-CCNC: 18 U/L (ref 5–45)
BASOPHILS # BLD AUTO: 0.07 THOUSANDS/ΜL (ref 0–0.1)
BASOPHILS NFR BLD AUTO: 1 % (ref 0–1)
BILIRUB SERPL-MCNC: 0.4 MG/DL (ref 0.2–1)
BUN SERPL-MCNC: 19 MG/DL (ref 5–25)
CALCIUM SERPL-MCNC: 8.7 MG/DL (ref 8.3–10.1)
CHLORIDE SERPL-SCNC: 103 MMOL/L (ref 100–108)
CO2 SERPL-SCNC: 26 MMOL/L (ref 21–32)
CREAT SERPL-MCNC: 1.02 MG/DL (ref 0.6–1.3)
EOSINOPHIL # BLD AUTO: 0.21 THOUSAND/ΜL (ref 0–0.61)
EOSINOPHIL NFR BLD AUTO: 3 % (ref 0–6)
ERYTHROCYTE [DISTWIDTH] IN BLOOD BY AUTOMATED COUNT: 12.8 % (ref 11.6–15.1)
GFR SERPL CREATININE-BSD FRML MDRD: 63 ML/MIN/1.73SQ M
GLUCOSE SERPL-MCNC: 99 MG/DL (ref 65–140)
HCT VFR BLD AUTO: 42.2 % (ref 34.8–46.1)
HGB BLD-MCNC: 13.8 G/DL (ref 11.5–15.4)
IMM GRANULOCYTES # BLD AUTO: 0.02 THOUSAND/UL (ref 0–0.2)
IMM GRANULOCYTES NFR BLD AUTO: 0 % (ref 0–2)
LYMPHOCYTES # BLD AUTO: 1.7 THOUSANDS/ΜL (ref 0.6–4.47)
LYMPHOCYTES NFR BLD AUTO: 22 % (ref 14–44)
MCH RBC QN AUTO: 29.2 PG (ref 26.8–34.3)
MCHC RBC AUTO-ENTMCNC: 32.7 G/DL (ref 31.4–37.4)
MCV RBC AUTO: 89 FL (ref 82–98)
MONOCYTES # BLD AUTO: 0.56 THOUSAND/ΜL (ref 0.17–1.22)
MONOCYTES NFR BLD AUTO: 7 % (ref 4–12)
NEUTROPHILS # BLD AUTO: 5.03 THOUSANDS/ΜL (ref 1.85–7.62)
NEUTS SEG NFR BLD AUTO: 67 % (ref 43–75)
NRBC BLD AUTO-RTO: 0 /100 WBCS
PLATELET # BLD AUTO: 274 THOUSANDS/UL (ref 149–390)
PMV BLD AUTO: 10.5 FL (ref 8.9–12.7)
POTASSIUM SERPL-SCNC: 4 MMOL/L (ref 3.5–5.3)
PROT SERPL-MCNC: 7.6 G/DL (ref 6.4–8.2)
RBC # BLD AUTO: 4.72 MILLION/UL (ref 3.81–5.12)
SODIUM SERPL-SCNC: 139 MMOL/L (ref 136–145)
WBC # BLD AUTO: 7.59 THOUSAND/UL (ref 4.31–10.16)

## 2018-08-17 PROCEDURE — 36415 COLL VENOUS BLD VENIPUNCTURE: CPT | Performed by: EMERGENCY MEDICINE

## 2018-08-17 PROCEDURE — 85025 COMPLETE CBC W/AUTO DIFF WBC: CPT | Performed by: EMERGENCY MEDICINE

## 2018-08-17 PROCEDURE — 93005 ELECTROCARDIOGRAM TRACING: CPT

## 2018-08-17 PROCEDURE — 80053 COMPREHEN METABOLIC PANEL: CPT | Performed by: EMERGENCY MEDICINE

## 2018-08-17 PROCEDURE — 99284 EMERGENCY DEPT VISIT MOD MDM: CPT

## 2018-08-17 PROCEDURE — 96360 HYDRATION IV INFUSION INIT: CPT

## 2018-08-17 RX ORDER — MECLIZINE HYDROCHLORIDE 25 MG/1
25 TABLET ORAL 3 TIMES DAILY PRN
Qty: 15 TABLET | Refills: 0 | Status: SHIPPED | OUTPATIENT
Start: 2018-08-17 | End: 2019-06-17 | Stop reason: ALTCHOICE

## 2018-08-17 RX ORDER — MECLIZINE HYDROCHLORIDE 25 MG/1
50 TABLET ORAL ONCE
Status: COMPLETED | OUTPATIENT
Start: 2018-08-17 | End: 2018-08-17

## 2018-08-17 RX ADMIN — SODIUM CHLORIDE 1000 ML: 0.9 INJECTION, SOLUTION INTRAVENOUS at 09:46

## 2018-08-17 RX ADMIN — MECLIZINE HYDROCHLORIDE 50 MG: 25 TABLET ORAL at 09:46

## 2018-08-17 NOTE — ED PROCEDURE NOTE
PROCEDURE  ECG 12 Lead Documentation  Date/Time: 8/17/2018 9:41 AM  Performed by: Randy Medrano  Authorized by: Randy Medrano     ECG reviewed by me, the ED Provider: yes    Patient location:  ED  Interpretation:     Interpretation: normal    Rate:     ECG rate:  54    ECG rate assessment: bradycardic    Rhythm:     Rhythm: sinus rhythm    Ectopy:     Ectopy: none    QRS:     QRS axis:  Normal    QRS intervals:  Normal  ST segments:     ST segments:  Normal  T waves:     T waves: normal           Roseline Thomason DO  08/17/18 [id number]

## 2018-08-17 NOTE — DISCHARGE INSTRUCTIONS
Benign Paroxysmal Positional Vertigo   WHAT YOU NEED TO KNOW:   BPPV is an inner ear condition that causes you to suddenly feel dizzy  Benign means it is not serious or life-threatening  BPPV is caused by a problem with the nerves and structure of your inner ear  BPPV happens when small pieces of calcium break loose and lump together in one of your inner ear canals  DISCHARGE INSTRUCTIONS:   Seek care immediately if:   · You fall during a BPPV episode and are injured  · You have a severe headache that does not go away  · You have new changes in your vision or feel weak or confused  · You have problems hearing, or you have ringing or buzzing in your ears  Contact your healthcare provider if:   · Your BPPV symptoms do not go away or they return  · You have problems with your balance, or you are falling often  · You have new or increased nausea or vomiting with vertigo  · You feel anxious or depressed and do not want to leave your home  · You have questions or concerns about your condition or care  Medicines:   · Medicines  may be recommended or prescribed to treat dizziness or nausea  · Take your medicine as directed  Contact your healthcare provider if you think your medicine is not helping or if you have side effects  Tell him of her if you are allergic to any medicine  Keep a list of the medicines, vitamins, and herbs you take  Include the amounts, and when and why you take them  Bring the list or the pill bottles to follow-up visits  Carry your medicine list with you in case of an emergency  Prevent your symptoms:   · Try to avoid sudden head movements  Stand up and lie down slowly  · Raise and support your head when you lie down  Place pillows under your upper back and head or rest in a recliner  · Change your position often when you are lying down  Try not to lie with your head on the same side for long periods of time  Roll over slowly       · Wear protective gear when you ride a bike or play sports  A helmet helps protect your head from injury  Follow up with your healthcare provider as directed: You may need to return in 1 month to check the progress of your treatment  Write down your questions so you remember to ask them during your visits  © 2017 [employer] Information is for End User's use only and may not be sold, redistributed or otherwise used for commercial purposes  All illustrations and images included in CareNotes® are the copyrighted property of [employer]  or Jarred Fuentes  The above information is an  only  It is not intended as medical advice for individual conditions or treatments  Talk to your doctor, nurse or pharmacist before following any medical regimen to see if it is safe and effective for you

## 2018-08-18 NOTE — ED PROVIDER NOTES
History  Chief Complaint   Patient presents with    Dizziness     pt at work  around 8:50am bent over and began to feel dizzy  has not subsided  no chest pain/ SOB  no hx vertigo     Patient presents for evaluation of dizziness  States at 8:50 am she bent over at work and began to feel dizzy  Symptoms worse with head movement and position  No headache  History provided by:  Patient   used: No    Dizziness   Associated symptoms: no chest pain, no headaches, no nausea, no shortness of breath and no vomiting        Prior to Admission Medications   Prescriptions Last Dose Informant Patient Reported? Taking? ALPRAZolam (XANAX) 0 5 mg tablet  Self Yes No   Sig: Take 1 tablet by mouth 2 (two) times a day as needed   ALPRAZolam ER (XANAX XR) 0 5 MG 24 hr tablet   Yes No   buPROPion (WELLBUTRIN XL) 300 mg 24 hr tablet  Self Yes No   Sig: Take by mouth   escitalopram (LEXAPRO) 10 mg tablet  Self Yes No   Sig: Take by mouth   ezetimibe-simvastatin (VYTORIN) 10-20 mg per tablet  Self No No   Sig: Take 1 tablet by mouth daily   fexofenadine (ALLEGRA) 30 MG tablet  Self Yes No   Sig: Take 30 mg by mouth 2 (two) times a day   metoprolol succinate (TOPROL-XL) 25 mg 24 hr tablet  Self Yes No   Sig: Take by mouth daily      Facility-Administered Medications: None       Past Medical History:   Diagnosis Date    Arthralgia     last assessed [id number]    Asthma with acute exacerbation 12/09/2006    Depression     Discoloration of skin of lower leg     last assessed 28Oct2015    Hyperlipidemia     Hypertension     Myalgia     H/O myalgia and myositis;  last assessed [id number]    Orthostatic hypotension     last assessed [id number]    Psychiatric disorder     Sleep disorder 07/25/2011    H/O transient disorder of initiating or maintaining sleep    Syncope 08/10/2011       History reviewed  No pertinent surgical history      Family History   Problem Relation Age of Onset    Diabetes Mother    Kandis Clarke Hypertension Mother     Hypertension Father      I have reviewed and agree with the history as documented  Social History   Substance Use Topics    Smoking status: Never Smoker    Smokeless tobacco: Never Used      Comment: former smoker---current some day smoker --niccotene dependence, per [facility]    Alcohol use Yes      Comment: moderately        Review of Systems   Respiratory: Negative for shortness of breath  Cardiovascular: Negative for chest pain  Gastrointestinal: Negative for abdominal pain, nausea and vomiting  Neurological: Positive for dizziness  Negative for headaches  All other systems reviewed and are negative  Physical Exam  Physical Exam   Constitutional: She is oriented to person, place, and time  No distress  HENT:   Mouth/Throat: Oropharynx is clear and moist    Eyes: EOM are normal  Pupils are equal, round, and reactive to light  Neck: Normal range of motion  Neck supple  Cardiovascular: Normal rate, regular rhythm and intact distal pulses  Pulmonary/Chest: Effort normal and breath sounds normal  No respiratory distress  Abdominal: Soft  There is no tenderness  Musculoskeletal: Normal range of motion  Neurological: She is alert and oriented to person, place, and time  No cranial nerve deficit or sensory deficit  She exhibits normal muscle tone  Coordination normal    Finger to nose wnl, heel to shin wnl   Skin: Capillary refill takes less than 2 seconds  She is not diaphoretic  Nursing note and vitals reviewed        Vital Signs  ED Triage Vitals   Temperature Pulse Respirations Blood Pressure SpO2   08/17/18 0922 08/17/18 0922 08/17/18 0922 08/17/18 0922 08/17/18 0922   98 6 °F (37 °C) 56 16 129/70 97 %      Temp Source Heart Rate Source Patient Position - Orthostatic VS BP Location FiO2 (%)   08/17/18 0922 08/17/18 0922 08/17/18 0922 08/17/18 0922 --   Oral Monitor Lying Left arm       Pain Score       08/17/18 0919       No Pain           Vitals: 08/17/18 1030 08/17/18 1049 08/17/18 1050 08/17/18 1052   BP: 119/73 113/68 130/70 129/73   Pulse: 62 63 56 67   Patient Position - Orthostatic VS:  Lying - Orthostatic VS Sitting - Orthostatic VS Standing - Orthostatic VS       Visual Acuity  Visual Acuity      Most Recent Value   L Pupil Size (mm)  3   R Pupil Size (mm)  3          ED Medications  Medications   sodium chloride 0 9 % bolus 1,000 mL (0 mL Intravenous Stopped 8/17/18 1059)   meclizine (ANTIVERT) tablet 50 mg (50 mg Oral Given 8/17/18 0946)       Diagnostic Studies  Results Reviewed     Procedure Component Value Units Date/Time    Comprehensive metabolic panel [[id number]] Collected:  08/17/18 0945    Lab Status:  Final result Specimen:  Blood from Arm, Right Updated:  08/17/18 1012     Sodium 139 mmol/L      Potassium 4 0 mmol/L      Chloride 103 mmol/L      CO2 26 mmol/L      Anion Gap 10 mmol/L      BUN 19 mg/dL      Creatinine 1 02 mg/dL      Glucose 99 mg/dL      Calcium 8 7 mg/dL      AST 18 U/L      ALT 24 U/L      Alkaline Phosphatase 97 U/L      Total Protein 7 6 g/dL      Albumin 3 8 g/dL      Total Bilirubin 0 40 mg/dL      eGFR 63 ml/min/1 73sq m     Narrative:         National Kidney Disease Education Program recommendations are as follows:  GFR calculation is accurate only with a steady state creatinine  Chronic Kidney disease less than 60 ml/min/1 73 sq  meters  Kidney failure less than 15 ml/min/1 73 sq  meters      CBC and differential [[id number]] Collected:  08/17/18 0945    Lab Status:  Final result Specimen:  Blood from Arm, Right Updated:  08/17/18 0955     WBC 7 59 Thousand/uL      RBC 4 72 Million/uL      Hemoglobin 13 8 g/dL      Hematocrit 42 2 %      MCV 89 fL      MCH 29 2 pg      MCHC 32 7 g/dL      RDW 12 8 %      MPV 10 5 fL      Platelets 285 Thousands/uL      nRBC 0 /100 WBCs      Neutrophils Relative 67 %      Immat GRANS % 0 %      Lymphocytes Relative 22 %      Monocytes Relative 7 %      Eosinophils Relative 3 % Basophils Relative 1 %      Neutrophils Absolute 5 03 Thousands/µL      Immature Grans Absolute 0 02 Thousand/uL      Lymphocytes Absolute 1 70 Thousands/µL      Monocytes Absolute 0 56 Thousand/µL      Eosinophils Absolute 0 21 Thousand/µL      Basophils Absolute 0 07 Thousands/µL                  No orders to display              Procedures  Procedures       Phone Contacts  ED Phone Contact    ED Course                               MDM  Number of Diagnoses or Management Options  Vertigo, benign positional:   Diagnosis management comments: Pulse ox 98% on RA indicating adequate oxygenation    Patient improved in the ER  No cerebellar signs, patient ambulating with steady gait  Amount and/or Complexity of Data Reviewed  Clinical lab tests: ordered and reviewed  Decide to obtain previous medical records or to obtain history from someone other than the patient: yes  Review and summarize past medical records: yes    Patient Progress  Patient progress: improved    CritCare Time    Disposition  Final diagnoses:   Vertigo, benign positional     Time reflects when diagnosis was documented in both MDM as applicable and the Disposition within this note     Time User Action Codes Description Comment    8/17/2018 10:44 AM Jimmy Ang Add [H81 10] Vertigo, benign positional       ED Disposition     ED Disposition Condition Comment    Discharge  Birdie Pringle discharge to home/self care      Condition at discharge: stable      Follow-up Information     Follow up With Specialties Details Why Contact Info    Courtney Herrera MD Family Medicine In 3 days  [facility] Otol[facility]ology In 1 week  [address] [place] [address]  [phone]            Discharge Medication List as of 8/17/2018 10:45 AM      START taking these medications    Details   meclizine (ANTIVERT) 25 mg tablet Take 1 tablet (25 mg total) by mouth 3 (three) times a day as needed for dizziness for up to 15 doses, Starting Fri 8/17/2018, Print         CONTINUE these medications which have NOT CHANGED    Details   ALPRAZolam (XANAX) 0 5 mg tablet Take 1 tablet by mouth 2 (two) times a day as needed, Starting Tue 10/23/2012, Historical Med      ALPRAZolam ER (XANAX XR) 0 5 MG 24 hr tablet Starting Fri 8/3/2018, Historical Med      buPROPion (WELLBUTRIN XL) 300 mg 24 hr tablet Take by mouth, Starting Mon 1/12/2015, Historical Med      escitalopram (LEXAPRO) 10 mg tablet Take by mouth, Starting Mon 1/12/2015, Historical Med      ezetimibe-simvastatin (VYTORIN) 10-20 mg per tablet Take 1 tablet by mouth daily, Starting Mon 5/7/2018, Normal      fexofenadine (ALLEGRA) 30 MG tablet Take 30 mg by mouth 2 (two) times a day, Historical Med      metoprolol succinate (TOPROL-XL) 25 mg 24 hr tablet Take by mouth daily, Starting Tue 1/5/2016, Historical Med           No discharge procedures on file      ED Provider  Electronically Signed by           Cristopher Momin DO  08/18/18 6135

## 2018-08-20 ENCOUNTER — VBI (OUTPATIENT)
Dept: ADMINISTRATIVE | Facility: OTHER | Age: 53
End: 2018-08-20

## 2018-08-20 ENCOUNTER — OFFICE VISIT (OUTPATIENT)
Dept: FAMILY MEDICINE CLINIC | Facility: CLINIC | Age: 53
End: 2018-08-20
Payer: COMMERCIAL

## 2018-08-20 ENCOUNTER — TELEPHONE (OUTPATIENT)
Dept: GASTROENTEROLOGY | Facility: CLINIC | Age: 53
End: 2018-08-20

## 2018-08-20 VITALS
DIASTOLIC BLOOD PRESSURE: 78 MMHG | HEART RATE: 80 BPM | WEIGHT: 195.8 LBS | SYSTOLIC BLOOD PRESSURE: 102 MMHG | HEIGHT: 65 IN | RESPIRATION RATE: 16 BRPM | BODY MASS INDEX: 32.62 KG/M2 | TEMPERATURE: 97.4 F

## 2018-08-20 DIAGNOSIS — R42 VERTIGO: Primary | ICD-10-CM

## 2018-08-20 LAB
ATRIAL RATE: 54 BPM
P AXIS: 21 DEGREES
PR INTERVAL: 118 MS
QRS AXIS: 72 DEGREES
QRSD INTERVAL: 82 MS
QT INTERVAL: 424 MS
QTC INTERVAL: 402 MS
T WAVE AXIS: 65 DEGREES
VENTRICULAR RATE: 54 BPM

## 2018-08-20 PROCEDURE — 93010 ELECTROCARDIOGRAM REPORT: CPT | Performed by: INTERNAL MEDICINE

## 2018-08-20 PROCEDURE — 99213 OFFICE O/P EST LOW 20 MIN: CPT | Performed by: FAMILY MEDICINE

## 2018-08-20 PROCEDURE — 3008F BODY MASS INDEX DOCD: CPT | Performed by: FAMILY MEDICINE

## 2018-08-20 NOTE — PATIENT INSTRUCTIONS
Benign Paroxysmal Positional Vertigo   AMBULATORY CARE:   Benign paroxysmal positional vertigo (BPPV)  is an inner ear condition that causes you to suddenly feel dizzy  Benign means it is not serious or life-threatening  BPPV is caused by a problem with the nerves and structure of your inner ear  BPPV happens when small pieces of calcium break loose and lump together in one of your inner ear canals  Common symptoms include the following: You may feel that you or the room is moving or spinning  Turning your head, rolling over in bed, getting up or lying down may lead to sudden vertigo  You may also have any of the following symptoms:  · Nystagmus (quick shaky eye movement that you cannot control)    · Nausea    · Poor balance and feeling unsteady when you walk  Seek care immediately if:   · You fall during a BPPV episode and are injured  · You have a severe headache that does not go away  · You have new changes in your vision or feel weak or confused  · You have problems hearing, or you have ringing or buzzing in your ears  Contact your healthcare provider if:   · Your BPPV symptoms do not go away or they return  · You have problems with your balance, or you are falling often  · You have new or increased nausea or vomiting with vertigo  · You feel anxious or depressed and do not want to leave your home  · You have questions or concerns about your condition or care  Management of BPPV:   · Your healthcare provider will teach you how to move your head and body to prevent symptoms  For example, he or she may teach you certain ways to move your head or body  These movements usually help relieve your symptoms and keep the dizziness from returning  The exercises help move the calcium pieces to a different part of your ear  Do the movements only as directed  · Vestibular and balance rehabilitation therapy (VBRT)  is used to teach you exercises to improve your balance and strength   VBRT may help decrease your dizziness and prevent injuries if you are at risk for falls  · Medicines  may be recommended or prescribed to treat dizziness or nausea  Prevent your symptoms:   · Try to avoid sudden head movements  Stand up and lie down slowly  · Raise and support your head when you lie down  Place pillows under your upper back and head or rest in a recliner  · Change your position often when you are lying down  Try not to lie with your head on the same side for long periods of time  Roll over slowly  · Wear protective gear  when you ride a bike or play sports  A helmet helps protect your head from injury  Follow up with your healthcare provider as directed: You may need to return in 1 month to check the progress of your treatment  Write down your questions so you remember to ask them during your visits  © 2017 [employer] [employer] Information is for End User's use only and may not be sold, redistributed or otherwise used for commercial purposes  All illustrations and images included in CareNotes® are the copyrighted property of A [employer] M [employer] Inc  or Jarred Fuentes  The above information is an  only  It is not intended as medical advice for individual conditions or treatments  Talk to your doctor, nurse or pharmacist before following any medical regimen to see if it is safe and effective for you

## 2018-08-20 NOTE — PROGRESS NOTES
Chief Complaint   Patient presents with    Follow-up     ED [facility] 8/17 vertigo,pt feeling better        Patient ID: Carol Solis is a 45 y o  female  HPI  Pt is seeing for f/u ER vsiit 3 days ago for vertigo -  Had similar episode 5 y ago -  Normal BP, ECG, labs in ECG, was Tx with Meclizine  -  Feeling better     The following portions of the patient's history were reviewed and updated as appropriate: allergies, current medications, past family history, past medical history, past social history, past surgical history and problem list     Review of Systems   Constitutional: Negative  Eyes: Negative  Respiratory: Negative  Genitourinary: Negative  Musculoskeletal: Negative  Neurological: Positive for dizziness  Negative for weakness and light-headedness  Hematological: Negative  Current Outpatient Prescriptions   Medication Sig Dispense Refill    ALPRAZolam ER (XANAX XR) 0 5 MG 24 hr tablet       buPROPion (WELLBUTRIN XL) 300 mg 24 hr tablet Take by mouth      escitalopram (LEXAPRO) 10 mg tablet Take by mouth      ezetimibe-simvastatin (VYTORIN) 10-20 mg per tablet Take 1 tablet by mouth daily 90 tablet 2    fexofenadine (ALLEGRA) 30 MG tablet Take 30 mg by mouth 2 (two) times a day      meclizine (ANTIVERT) 25 mg tablet Take 1 tablet (25 mg total) by mouth 3 (three) times a day as needed for dizziness for up to 15 doses 15 tablet 0    metoprolol succinate (TOPROL-XL) 25 mg 24 hr tablet Take by mouth daily       No current facility-administered medications for this visit  Objective:    /78 (BP Location: Right arm, Patient Position: Sitting, Cuff Size: Large)   Pulse 80   Temp (!) 97 4 °F (36 3 °C)   Resp 16   Ht 5' 5" (1 651 m)   Wt 88 8 kg (195 lb 12 8 oz)   BMI 32 58 kg/m²        Physical Exam   Constitutional: She is oriented to person, place, and time  Eyes: Conjunctivae and EOM are normal    Cardiovascular: Normal rate      Musculoskeletal: She exhibits no edema  Neurological: She is oriented to person, place, and time  No cranial nerve deficit  Skin: No pallor  Psychiatric: She has a normal mood and affect  Labs in chart were reviewed        Assessment/Plan:         Diagnoses and all orders for this visit:    Vertigo      home exercises given  Cont with Meclizine PRN    rto prn                       Erasto Loco MD

## 2018-11-01 ENCOUNTER — ANESTHESIA EVENT (OUTPATIENT)
Dept: GASTROENTEROLOGY | Facility: AMBULARY SURGERY CENTER | Age: 53
End: 2018-11-01
Payer: COMMERCIAL

## 2018-11-01 NOTE — PRE-PROCEDURE INSTRUCTIONS
Pre-Surgery Instructions:   Medication Instructions    ALPRAZolam ER (XANAX XR) 0 5 MG 24 hr tablet Patient was instructed by Physician and understands   buPROPion (WELLBUTRIN XL) 300 mg 24 hr tablet Patient was instructed by Physician and understands   escitalopram (LEXAPRO) 10 mg tablet Patient was instructed by Physician and understands   ezetimibe-simvastatin (VYTORIN) 10-20 mg per tablet Patient was instructed by Physician and understands   fexofenadine (ALLEGRA) 30 MG tablet Patient was instructed by Physician and understands   meclizine (ANTIVERT) 25 mg tablet Patient was instructed by Physician and understands   metoprolol succinate (TOPROL-XL) 25 mg 24 hr tablet Patient was instructed by Physician and understands  Pt to follow Dr Muhammad Party instructions    father Sylvie Newton [phone]

## 2018-11-02 ENCOUNTER — HOSPITAL ENCOUNTER (OUTPATIENT)
Facility: AMBULARY SURGERY CENTER | Age: 53
Setting detail: OUTPATIENT SURGERY
Discharge: HOME | End: 2018-11-02
Attending: INTERNAL MEDICINE | Admitting: INTERNAL MEDICINE
Payer: COMMERCIAL

## 2018-11-02 ENCOUNTER — ANESTHESIA (OUTPATIENT)
Dept: GASTROENTEROLOGY | Facility: AMBULARY SURGERY CENTER | Age: 53
End: 2018-11-02
Payer: COMMERCIAL

## 2018-11-02 VITALS
DIASTOLIC BLOOD PRESSURE: 68 MMHG | OXYGEN SATURATION: 100 % | HEART RATE: 56 BPM | TEMPERATURE: 97.6 F | HEIGHT: 65 IN | SYSTOLIC BLOOD PRESSURE: 142 MMHG | BODY MASS INDEX: 30.82 KG/M2 | WEIGHT: 185 LBS | RESPIRATION RATE: 16 BRPM

## 2018-11-02 PROCEDURE — G0121 COLON CA SCRN NOT HI RSK IND: HCPCS | Performed by: INTERNAL MEDICINE

## 2018-11-02 RX ORDER — PROPOFOL 10 MG/ML
INJECTION, EMULSION INTRAVENOUS AS NEEDED
Status: DISCONTINUED | OUTPATIENT
Start: 2018-11-02 | End: 2018-11-02 | Stop reason: SURG

## 2018-11-02 RX ORDER — PROPOFOL 10 MG/ML
INJECTION, EMULSION INTRAVENOUS CONTINUOUS PRN
Status: DISCONTINUED | OUTPATIENT
Start: 2018-11-02 | End: 2018-11-02 | Stop reason: SURG

## 2018-11-02 RX ORDER — SODIUM CHLORIDE 9 MG/ML
125 INJECTION, SOLUTION INTRAVENOUS CONTINUOUS
Status: DISCONTINUED | OUTPATIENT
Start: 2018-11-02 | End: 2018-11-02 | Stop reason: HOSPADM

## 2018-11-02 RX ADMIN — PROPOFOL 80 MG: 10 INJECTION, EMULSION INTRAVENOUS at 08:41

## 2018-11-02 RX ADMIN — PROPOFOL 100 MCG/KG/MIN: 10 INJECTION, EMULSION INTRAVENOUS at 08:41

## 2018-11-02 RX ADMIN — SODIUM CHLORIDE 125 ML/HR: 0.9 INJECTION, SOLUTION INTRAVENOUS at 08:22

## 2018-11-02 NOTE — ANESTHESIA PREPROCEDURE EVALUATION
Review of Systems/Medical History  Patient summary reviewed  Chart reviewed  No history of anesthetic complications     Cardiovascular  Exercise tolerance (METS): >4,  Hyperlipidemia, Hypertension ,    Pulmonary       GI/Hepatic            Endo/Other     GYN       Hematology   Musculoskeletal       Neurology   Psychology   Anxiety, Depression ,              Physical Exam    Airway    Mallampati score: II  TM Distance: >3 FB  Neck ROM: full     Dental   No notable dental hx     Cardiovascular  Cardiovascular exam normal    Pulmonary  Pulmonary exam normal     Other Findings        Anesthesia Plan  ASA Score- 2     Anesthesia Type- IV sedation with anesthesia with ASA Monitors  Additional Monitors:   Airway Plan:         Plan Factors-    Induction-     Postoperative Plan-     Informed Consent- Anesthetic plan and risks discussed with patient

## 2018-11-02 NOTE — OP NOTE
COLONOSCOPY    PROCEDURE: Colonoscopy    INDICATIONS: Screening for Colon Cancer    POST-OP DIAGNOSIS: See the impression below    SEDATION: Monitored anesthesia care, check anesthesia records    PHYSICAL EXAM:    /78   Pulse 57   Temp 97 6 °F (36 4 °C) (Tympanic)   Resp 18   Ht 5' 5" (1 651 m)   Wt 83 9 kg (185 lb)   SpO2 98%   BMI 30 79 kg/m²   Body mass index is 30 79 kg/m²  General: NAD  Heart: S1 & S2 normal, RRR  Lungs: CTA, No rales or rhonchi  Abdomen: Soft, nontender, nondistended, good bowel sounds    CONSENT:  Informed consent was obtained for the procedure, including sedation after explaining the risks and benefits of the procedure  Risks including but not limited to bleeding, perforation, infection, aspiration were discussed in detail  Also explained about less than 100%$ sensitivity with the exam and other alternatives  PREPARATION:   EKG tracing, pulse oximetry, blood pressure were monitored throughout the procedure  Patient was identified by myself both verbally and by visual inspection of ID band  DESCRIPTION:   Patient was placed in the left lateral decubitus position and was sedated with the above medication  Digital rectal examination was performed  The colonoscope was introduced in to the anal canal and advanced up to cecum, which was identified by the appendiceal orifice and IC valve  A careful inspection was made as the colonoscope was withdrawn, including a retroflexed view of the rectum; findings and interventions are described below  Appropriate photodocumentation was obtained  The quality of the colonic preparation was adequate  FINDINGS:    1  Cecum and ileocecal valve-normal mucosa    2  The rest of the colon mucosa is normal   No evidence of any polyps or other lesions  IMPRESSIONS:      As above    RECOMMENDATIONS:    Next colonoscopy 10 years    COMPLICATIONS:  None; patient tolerated the procedure well      DISPOSITION: PACU           CONDITION: Stable

## 2018-11-02 NOTE — H&P
History and Physical - [facility]  David Meseret 52 y o  female MRN: [id number]        HPI: 57-year-old female with history of hypertension, hyperlipidemia was referred for colonoscopy  Patient never had colonoscopy in the past   Her sister has history of colon polyps  Patient has regular bowel movements with occasional constipation and denies any blood or mucus in the stool  Appetite is good and denies any recent weight loss  Denies any abdominal pain, nausea, or vomiting  Has no heartburn or acid reflux  Denies any difficulty swallowing                       Historical Information   Past Medical History:   Diagnosis Date    Depression     Hyperlipidemia     Hypertension     Orthostatic hypotension 2011    Other specified anxiety disorders     PONV (postoperative nausea and vomiting)     Syncope     last episode 8/2018     Past Surgical History:   Procedure Laterality Date    CATARACT EXTRACTION Bilateral     COLONOSCOPY      DILATION AND EVACUATION      ENDOMETRIAL BIOPSY  03/2017     Social History   History   Alcohol Use    Yes     Comment:  x 2 a month     History   Drug Use No     History   Smoking Status    Former Smoker    Quit date: 2015   Smokeless Tobacco    Never Used     Family History   Problem Relation Age of Onset    Diabetes Mother     Hypertension Mother     Hyperlipidemia Mother    Patrizia Clancy Hypertension Father     Hyperlipidemia Sister     Hypertension Sister     Diabetes Maternal Grandmother        Meds/Allergies     Prescriptions Prior to Admission   Medication    ALPRAZolam ER (XANAX XR) 0 5 MG 24 hr tablet    buPROPion (WELLBUTRIN XL) 300 mg 24 hr tablet    escitalopram (LEXAPRO) 10 mg tablet    ezetimibe-simvastatin (VYTORIN) 10-20 mg per tablet    fexofenadine (ALLEGRA) 30 MG tablet    metoprolol succinate (TOPROL-XL) 25 mg 24 hr tablet    meclizine (ANTIVERT) 25 mg tablet       Allergies   Allergen Reactions    Prednisone Anxiety Reaction Date: 14Feb2012;        Objective     Blood pressure 159/78, pulse 57, temperature 97 6 °F (36 4 °C), temperature source Tympanic, resp  rate 18, height 5' 5" (1 651 m), weight 83 9 kg (185 lb), SpO2 98 %, not currently breastfeeding      PHYSICAL EXAM:    Gen: NAD  CV: S1 & S2 normal, RRR  CHEST: Clear to auscultate  ABD: soft, NT/ND, good bowel sounds  EXT: no edema    ASSESSMENT:     Screening for colon cancer    PLAN:    Colonoscopy

## 2018-12-21 DIAGNOSIS — I10 ESSENTIAL HYPERTENSION: Primary | ICD-10-CM

## 2018-12-21 RX ORDER — METOPROLOL SUCCINATE 25 MG/1
25 TABLET, EXTENDED RELEASE ORAL
Qty: 90 TABLET | Refills: 3 | Status: SHIPPED | OUTPATIENT
Start: 2018-12-21 | End: 2020-02-26 | Stop reason: SDUPTHER

## 2019-01-28 ENCOUNTER — OFFICE VISIT (OUTPATIENT)
Dept: FAMILY MEDICINE CLINIC | Facility: CLINIC | Age: 54
End: 2019-01-28
Payer: COMMERCIAL

## 2019-01-28 VITALS
RESPIRATION RATE: 16 BRPM | BODY MASS INDEX: 31.16 KG/M2 | TEMPERATURE: 98.1 F | HEART RATE: 68 BPM | SYSTOLIC BLOOD PRESSURE: 140 MMHG | HEIGHT: 65 IN | WEIGHT: 187 LBS | DIASTOLIC BLOOD PRESSURE: 80 MMHG

## 2019-01-28 DIAGNOSIS — R33.9 URINARY RETENTION: Primary | ICD-10-CM

## 2019-01-28 DIAGNOSIS — J06.9 UPPER RESPIRATORY TRACT INFECTION, UNSPECIFIED TYPE: ICD-10-CM

## 2019-01-28 LAB
SL AMB  POCT GLUCOSE, UA: NORMAL
SL AMB LEUKOCYTE ESTERASE,UA: NORMAL
SL AMB POCT BILIRUBIN,UA: NORMAL
SL AMB POCT BLOOD,UA: NORMAL
SL AMB POCT CLARITY,UA: CLEAR
SL AMB POCT COLOR,UA: YELLOW
SL AMB POCT KETONES,UA: NORMAL
SL AMB POCT NITRITE,UA: NORMAL
SL AMB POCT PH,UA: 7
SL AMB POCT SPECIFIC GRAVITY,UA: 1
SL AMB POCT URINE PROTEIN: NORMAL

## 2019-01-28 PROCEDURE — 81003 URINALYSIS AUTO W/O SCOPE: CPT | Performed by: FAMILY MEDICINE

## 2019-01-28 PROCEDURE — 3008F BODY MASS INDEX DOCD: CPT | Performed by: FAMILY MEDICINE

## 2019-01-28 PROCEDURE — 99213 OFFICE O/P EST LOW 20 MIN: CPT | Performed by: FAMILY MEDICINE

## 2019-01-28 PROCEDURE — 1036F TOBACCO NON-USER: CPT | Performed by: FAMILY MEDICINE

## 2019-01-28 NOTE — LETTER
January 28, 2019     Patient: Kwadwo Graham   YOB: 1965   Date of Visit: 1/28/2019       To Whom it May Concern:    Kwadwo Graham is under my professional care  She was seen in my office on 1/28/2019  She may return to work on 1/31/19  If you have any questions or concerns, please don't hesitate to call           Sincerely,          Danyel Lieberman MD        CC: No Recipients (4) excellent

## 2019-01-28 NOTE — PROGRESS NOTES
Chief Complaint   Patient presents with    Urinary Tract Infection    URI        Patient ID: Jarett Alford is a 52 y o  female  HPI  Pt is seeing for feeling of incomplete urination x 2 days and runny nose since this am  -  No therapy tried  -  No other symptoms     The following portions of the patient's history were reviewed and updated as appropriate: allergies, current medications, past family history, past medical history, past social history, past surgical history and problem list     Review of Systems   Constitutional: Negative  HENT: Positive for congestion and rhinorrhea  Negative for sinus pressure, sneezing and sore throat  Respiratory: Negative  Gastrointestinal: Negative  Current Outpatient Prescriptions   Medication Sig Dispense Refill    ALPRAZolam ER (XANAX XR) 0 5 MG 24 hr tablet as needed        buPROPion (WELLBUTRIN XL) 300 mg 24 hr tablet Take 300 mg by mouth every morning        escitalopram (LEXAPRO) 10 mg tablet Take 10 mg by mouth daily at bedtime        ezetimibe-simvastatin (VYTORIN) 10-20 mg per tablet Take 1 tablet by mouth daily (Patient taking differently: Take 1 tablet by mouth daily at bedtime  ) 90 tablet 2    fexofenadine (ALLEGRA) 30 MG tablet Take 30 mg by mouth as needed        meclizine (ANTIVERT) 25 mg tablet Take 1 tablet (25 mg total) by mouth 3 (three) times a day as needed for dizziness for up to 15 doses 15 tablet 0    metoprolol succinate (TOPROL-XL) 25 mg 24 hr tablet Take 1 tablet (25 mg total) by mouth daily at bedtime 90 tablet 3     No current facility-administered medications for this visit          Objective:    /80 (BP Location: Right arm, Patient Position: Sitting, Cuff Size: Large)   Pulse 68   Temp 98 1 °F (36 7 °C) (Tympanic)   Resp 16   Ht 5' 5" (1 651 m)   Wt 84 8 kg (187 lb)   BMI 31 12 kg/m²        Physical Exam   HENT:   Right Ear: Tympanic membrane normal    Left Ear: Tympanic membrane normal    Mouth/Throat: Oropharynx is clear and moist  No oropharyngeal exudate  Eyes: Conjunctivae are normal    Pulmonary/Chest: Effort normal  No respiratory distress  She has no wheezes  She has no rales                 Assessment/Plan:         Diagnoses and all orders for this visit:    Urinary retention  -     POCT urine dip auto non-scope -  Normal     Upper respiratory tract infection, unspecified type   normal exam  Likely viral    rto prn                           Grace Whitehead MD

## 2019-03-20 ENCOUNTER — OFFICE VISIT (OUTPATIENT)
Dept: FAMILY MEDICINE CLINIC | Facility: CLINIC | Age: 54
End: 2019-03-20
Payer: COMMERCIAL

## 2019-03-20 VITALS
HEIGHT: 65 IN | TEMPERATURE: 97.8 F | RESPIRATION RATE: 14 BRPM | DIASTOLIC BLOOD PRESSURE: 72 MMHG | HEART RATE: 72 BPM | SYSTOLIC BLOOD PRESSURE: 110 MMHG | WEIGHT: 185 LBS | BODY MASS INDEX: 30.82 KG/M2

## 2019-03-20 DIAGNOSIS — S66.911A STRAIN OF RIGHT WRIST, INITIAL ENCOUNTER: Primary | ICD-10-CM

## 2019-03-20 PROCEDURE — 99213 OFFICE O/P EST LOW 20 MIN: CPT | Performed by: NURSE PRACTITIONER

## 2019-03-20 NOTE — PROGRESS NOTES
Chief Complaint   Patient presents with    Wrist Pain     rt  wrist pain        Patient ID: Kati Ceballos is a 52 y o  female  New c/o onset two weeks ago pt reports her dog pulled on the leash and now she is having swelling at the outer portion of the right wrist and fifth metacarpal  She states the pain comes and goes and there is mild swelling in the area of concern  She has treated this with ibuprofen at home without relief/it upsets her stomach too  Past Medical History:   Diagnosis Date    Depression     Hyperlipidemia     Hypertension     Orthostatic hypotension 2011    Other specified anxiety disorders     PONV (postoperative nausea and vomiting)     Syncope     last episode 8/2018       Past Surgical History:   Procedure Laterality Date    CATARACT EXTRACTION Bilateral     COLONOSCOPY      DILATION AND EVACUATION      ENDOMETRIAL BIOPSY  03/2017    VA COLONOSCOPY FLX DX W/COLLJ SPEC WHEN PFRMD N/A 11/2/2018    Procedure: COLONOSCOPY;  Surgeon: Neal Love MD;  Location: [place] [address] [facility];   Service: Gastroenterology       Patient Active Problem List   Diagnosis    Benign hypertension    Depression    Hyperlipidemia    Insomnia    Other specified anxiety disorders    Vitamin D deficiency    Screening for colon cancer    Encounter for screening colonoscopy    Family history of colonic polyps       Family History   Problem Relation Age of Onset    Diabetes Mother     Hypertension Mother     Hyperlipidemia Mother    Michelle Talamantes Hypertension Father     Hyperlipidemia Sister     Hypertension Sister     Diabetes Maternal Grandmother        Immunization History   Administered Date(s) Administered    INFLUENZA 02/25/2019    Influenza Quadrivalent Preservative Free 3 years and older IM 10/28/2015    Influenza TIV (IM) 10/04/2007, 10/05/2012       Allergies   Allergen Reactions    Prednisone Anxiety     Reaction Date: [id number];        Current Outpatient Medications   Medication Sig Dispense Refill    ALPRAZolam ER (XANAX XR) 0 5 MG 24 hr tablet as needed        buPROPion (WELLBUTRIN XL) 300 mg 24 hr tablet Take 300 mg by mouth every morning        escitalopram (LEXAPRO) 10 mg tablet Take 10 mg by mouth daily at bedtime        ezetimibe-simvastatin (VYTORIN) 10-20 mg per tablet Take 1 tablet by mouth daily (Patient taking differently: Take 1 tablet by mouth daily at bedtime  ) 90 tablet 2    fexofenadine (ALLEGRA) 30 MG tablet Take 30 mg by mouth as needed        meclizine (ANTIVERT) 25 mg tablet Take 1 tablet (25 mg total) by mouth 3 (three) times a day as needed for dizziness for up to 15 doses 15 tablet 0    metoprolol succinate (TOPROL-XL) 25 mg 24 hr tablet Take 1 tablet (25 mg total) by mouth daily at bedtime 90 tablet 3     No current facility-administered medications for this visit          Social History     Socioeconomic History    Marital status: Single     Spouse name: None    Number of children: None    Years of education: None    Highest education level: None   Occupational History    None   Social Needs    Financial resource strain: None    Food insecurity:     Worry: None     Inability: None    Transportation needs:     Medical: None     Non-medical: None   Tobacco Use    Smoking status: Former Smoker     Last attempt to quit: 2015     Years since quittin 2    Smokeless tobacco: Never Used   Substance and Sexual Activity    Alcohol use: Yes     Comment:  x 2 a month    Drug use: No    Sexual activity: None   Lifestyle    Physical activity:     Days per week: None     Minutes per session: None    Stress: None   Relationships    Social connections:     Talks on phone: None     Gets together: None     Attends Hindu service: None     Active member of club or organization: None     Attends meetings of clubs or organizations: None     Relationship status: None    Intimate partner violence:     Fear of current or ex partner: None     Emotionally abused: None     Physically abused: None     Forced sexual activity: None   Other Topics Concern    None   Social History Narrative    None       Review of Systems   Constitutional: Negative  Musculoskeletal: Positive for arthralgias  Neurological: Negative  Objective:    /72 (BP Location: Left arm, Patient Position: Sitting, Cuff Size: Adult)   Pulse 72   Temp 97 8 °F (36 6 °C) (Temporal)   Resp 14   Ht 5' 5" (1 651 m)   Wt 83 9 kg (185 lb)   BMI 30 79 kg/m²        Physical Exam   Constitutional: She appears well-developed and well-nourished  Musculoskeletal: Normal range of motion  Range of motion at the right wrist and hand are intact  There is noticeable mild swelling along the ulnar surface along the fifth metacarpal   There is tenderness lateral to the distal ulnar surface  Assessment/Plan:    No problem-specific Assessment & Plan notes found for this encounter  Diagnoses and all orders for this visit:    Strain of right wrist, initial encounter  Comments:  Patient unable to tolerate oral medications due to GI side effects  Will refer specialty for further eval and possible localized treatment  Orders:  -     Ambulatory referral to Orthopedic Surgery; Future            There are no Patient Instructions on file for this visit                    Jihan Smith

## 2019-03-21 ENCOUNTER — OFFICE VISIT (OUTPATIENT)
Dept: OBGYN CLINIC | Facility: CLINIC | Age: 54
End: 2019-03-21
Payer: COMMERCIAL

## 2019-03-21 ENCOUNTER — APPOINTMENT (OUTPATIENT)
Dept: RADIOLOGY | Facility: CLINIC | Age: 54
End: 2019-03-21
Payer: COMMERCIAL

## 2019-03-21 VITALS
SYSTOLIC BLOOD PRESSURE: 126 MMHG | HEART RATE: 65 BPM | DIASTOLIC BLOOD PRESSURE: 79 MMHG | HEIGHT: 65 IN | BODY MASS INDEX: 30.59 KG/M2 | WEIGHT: 183.6 LBS

## 2019-03-21 DIAGNOSIS — S66.911A STRAIN OF RIGHT WRIST, INITIAL ENCOUNTER: Primary | ICD-10-CM

## 2019-03-21 DIAGNOSIS — S66.911A STRAIN OF RIGHT WRIST, INITIAL ENCOUNTER: ICD-10-CM

## 2019-03-21 PROCEDURE — 99243 OFF/OP CNSLTJ NEW/EST LOW 30: CPT | Performed by: ORTHOPAEDIC SURGERY

## 2019-03-21 PROCEDURE — 73110 X-RAY EXAM OF WRIST: CPT

## 2019-03-21 NOTE — LETTER
March 21, 2019     [facility] [address]  [address] [facility] [address]    Patient: Ghada Valdez   YOB: 1965   Date of Visit: 3/21/2019       Dear Dr Melissa Rivera: Thank you for referring Ghada Valdez to me for evaluation  Below are the relevant portions of my assessment and plan of care  I discussed with Michael Pretty today that her signs and symptoms are consistent with ECU strain  Treatment options were discussed in the form of bracing for the next 6 weeks  A steroid injection was discussed and deferred today  She was fitted and provided with a cock-up wrist brace today  She will follow up in 4 weeks for repeat evaluation  If you have questions, please do not hesitate to call me  I look forward to following Yanique along with you           Sincerely,        Cherelle Keller,         CC: No Recipients

## 2019-03-21 NOTE — PROGRESS NOTES
Assessment/Plan:  1  Strain of right wrist, initial encounter  Ambulatory referral to Orthopedic Surgery    XR wrist 3+ vw right    ECU   2  Strain of right wrist, initial encounter  Ambulatory referral to Orthopedic Surgery    XR wrist 3+ vw right    Patient unable to tolerate oral medications due to GI side effects  Will refer specialty for further eval and possible localized treatment  Scribe Attestation    I,:   Lizbeth Lee MA am acting as a scribe while in the presence of the attending physician :        I,:   Josefina Devi DO personally performed the services described in this documentation    as scribed in my presence :              I discussed with Thania Esquivel today that her signs and symptoms are consistent with ECU strain  Treatment options were discussed in the form of bracing for the next 6 weeks  A steroid injection was discussed and deferred today  She was fitted and provided with a cock-up wrist brace today  She was advised to avoid heavy lifting with the RUE  She will follow up in 4 weeks for repeat evaluation  Subjective:   Kiko Adams is a 52 y o  female who presents to the office today as a referral from her PCP for evaluation of right wrist pain  She states this has been ongoing for the past 2 weeks  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  She notes pain to the ulnar aspect of her wrist  She notes swelling to the area as well  She is training a puppy a and is doing a lot with the wrist  She states this is increased with extension  She states she did get an OTC brace to wear yesterday  She denies any fall or injury  She denies any numbness or tingling  Review of Systems   Constitutional: Negative for chills and fever  HENT: Negative for drooling and sneezing  Eyes: Negative for redness  Respiratory: Negative for cough and wheezing  Gastrointestinal: Negative for nausea and vomiting     Musculoskeletal: Positive for arthralgias  Negative for joint swelling and myalgias  Neurological: Negative for weakness and numbness  Psychiatric/Behavioral: Negative for behavioral problems  The patient is not nervous/anxious  Past Medical History:   Diagnosis Date    Depression     Hyperlipidemia     Hypertension     Orthostatic hypotension     Other specified anxiety disorders     PONV (postoperative nausea and vomiting)     Syncope     last episode 2018       Past Surgical History:   Procedure Laterality Date    CATARACT EXTRACTION Bilateral     COLONOSCOPY      DILATION AND EVACUATION      ENDOMETRIAL BIOPSY  2017    RI COLONOSCOPY FLX DX W/COLLJ SPEC WHEN PFRMD N/A 2018    Procedure: COLONOSCOPY;  Surgeon: Bi Rousseau MD;  Location: [facility]; Service: Gastroenterology       Family History   Problem Relation Age of Onset    Diabetes Mother     Hypertension Mother     Hyperlipidemia Mother     Hypertension Father     Hyperlipidemia Sister     Hypertension Sister     Diabetes Maternal Grandmother     No Known Problems Brother     No Known Problems Maternal Aunt     No Known Problems Maternal Uncle     No Known Problems Paternal Aunt     No Known Problems Paternal Uncle     No Known Problems Maternal Grandfather     No Known Problems Paternal Grandmother     No Known Problems Paternal Grandfather     ADD / ADHD Neg Hx     Anesthesia problems Neg Hx     Cancer Neg Hx     Clotting disorder Neg Hx     Collagen disease Neg Hx     Dislocations Neg Hx     Learning disabilities Neg Hx     Neurological problems Neg Hx     Osteoporosis Neg Hx     Rheumatologic disease Neg Hx     Scoliosis Neg Hx     Vascular Disease Neg Hx        Social History     Occupational History    Not on file   Tobacco Use    Smoking status: Former Smoker     Last attempt to quit: 2015     Years since quittin 2    Smokeless tobacco: Never Used   Substance and Sexual Activity    Alcohol use:  Yes Comment: occais    Drug use: No    Sexual activity: Not on file         Current Outpatient Medications:     ALPRAZolam ER (XANAX XR) 0 5 MG 24 hr tablet, as needed  , Disp: , Rfl:     buPROPion (WELLBUTRIN XL) 300 mg 24 hr tablet, Take 300 mg by mouth every morning  , Disp: , Rfl:     escitalopram (LEXAPRO) 10 mg tablet, Take 10 mg by mouth daily at bedtime  , Disp: , Rfl:     ezetimibe-simvastatin (VYTORIN) 10-20 mg per tablet, Take 1 tablet by mouth daily (Patient taking differently: Take 1 tablet by mouth daily at bedtime  ), Disp: 90 tablet, Rfl: 2    fexofenadine (ALLEGRA) 30 MG tablet, Take 30 mg by mouth as needed  , Disp: , Rfl:     meclizine (ANTIVERT) 25 mg tablet, Take 1 tablet (25 mg total) by mouth 3 (three) times a day as needed for dizziness for up to 15 doses, Disp: 15 tablet, Rfl: 0    metoprolol succinate (TOPROL-XL) 25 mg 24 hr tablet, Take 1 tablet (25 mg total) by mouth daily at bedtime, Disp: 90 tablet, Rfl: 3    Allergies   Allergen Reactions    Prednisone Anxiety     Reaction Date: [id number];        Objective:  Vitals:    03/21/19 0838   BP: 126/79   Pulse: 65       Ortho Exam     Right wrist    TTP FCU tendon  TTP ECU over 5th metacarpal   Mild TTP TFCC  No subluxation of tendon   DURJ stable  Sensation intact median, radial, and ulnar nerve  Compartments soft  Brisk capillary refill    Physical Exam   Constitutional: She is oriented to person, place, and time  She appears well-developed and well-nourished  HENT:   Head: Normocephalic and atraumatic  Eyes: Conjunctivae are normal  Right eye exhibits no discharge  Left eye exhibits no discharge  Neck: Normal range of motion  Neck supple  Cardiovascular: Normal rate and intact distal pulses  Pulmonary/Chest: Effort normal  No respiratory distress  Musculoskeletal:   As noted in HPI   Neurological: She is alert and oriented to person, place, and time  Skin: Skin is warm and dry     Psychiatric: She has a normal mood and affect  Her behavior is normal  Judgment and thought content normal        I have personally reviewed pertinent films in PACS and my interpretation is as follows:X-ray right wrist performed in the office today demonstrates no fractures or dislocations

## 2019-06-17 ENCOUNTER — OFFICE VISIT (OUTPATIENT)
Dept: URGENT CARE | Facility: CLINIC | Age: 54
End: 2019-06-17
Payer: COMMERCIAL

## 2019-06-17 VITALS
SYSTOLIC BLOOD PRESSURE: 100 MMHG | WEIGHT: 184.8 LBS | BODY MASS INDEX: 30.79 KG/M2 | TEMPERATURE: 99.5 F | HEIGHT: 65 IN | OXYGEN SATURATION: 97 % | DIASTOLIC BLOOD PRESSURE: 58 MMHG | HEART RATE: 56 BPM | RESPIRATION RATE: 16 BRPM

## 2019-06-17 DIAGNOSIS — R39.9 UTI SYMPTOMS: Primary | ICD-10-CM

## 2019-06-17 LAB
SL AMB  POCT GLUCOSE, UA: ABNORMAL
SL AMB LEUKOCYTE ESTERASE,UA: ABNORMAL
SL AMB POCT BILIRUBIN,UA: ABNORMAL
SL AMB POCT BLOOD,UA: ABNORMAL
SL AMB POCT CLARITY,UA: CLEAR
SL AMB POCT COLOR,UA: YELLOW
SL AMB POCT KETONES,UA: ABNORMAL
SL AMB POCT NITRITE,UA: ABNORMAL
SL AMB POCT PH,UA: 7.5
SL AMB POCT SPECIFIC GRAVITY,UA: 1
SL AMB POCT URINE PROTEIN: ABNORMAL
SL AMB POCT UROBILINOGEN: 0.2

## 2019-06-17 PROCEDURE — 87077 CULTURE AEROBIC IDENTIFY: CPT | Performed by: PHYSICIAN ASSISTANT

## 2019-06-17 PROCEDURE — 81002 URINALYSIS NONAUTO W/O SCOPE: CPT | Performed by: PHYSICIAN ASSISTANT

## 2019-06-17 PROCEDURE — 87086 URINE CULTURE/COLONY COUNT: CPT | Performed by: PHYSICIAN ASSISTANT

## 2019-06-17 PROCEDURE — 99214 OFFICE O/P EST MOD 30 MIN: CPT | Performed by: PHYSICIAN ASSISTANT

## 2019-06-17 PROCEDURE — 87186 SC STD MICRODIL/AGAR DIL: CPT | Performed by: PHYSICIAN ASSISTANT

## 2019-06-17 RX ORDER — NITROFURANTOIN 25; 75 MG/1; MG/1
100 CAPSULE ORAL 2 TIMES DAILY
Qty: 14 CAPSULE | Refills: 0 | Status: SHIPPED | OUTPATIENT
Start: 2019-06-17 | End: 2019-07-10 | Stop reason: ALTCHOICE

## 2019-06-19 ENCOUNTER — TELEPHONE (OUTPATIENT)
Dept: URGENT CARE | Facility: CLINIC | Age: 54
End: 2019-06-19

## 2019-06-19 LAB — BACTERIA UR CULT: ABNORMAL

## 2019-06-19 NOTE — TELEPHONE ENCOUNTER
Pt called office inquiring her results from the urine culture; reviewed the preliminary results with pt; Pt stated the initial symptoms were better, no pain; Informed the pt that someone in the office will call her when the final results are confirmed; I consulted with Luis Carlos Hull about the preliminary results and he confirmed the [address] was appropriate for her

## 2019-07-10 ENCOUNTER — OFFICE VISIT (OUTPATIENT)
Dept: URGENT CARE | Facility: CLINIC | Age: 54
End: 2019-07-10
Payer: COMMERCIAL

## 2019-07-10 VITALS
TEMPERATURE: 98.6 F | DIASTOLIC BLOOD PRESSURE: 68 MMHG | SYSTOLIC BLOOD PRESSURE: 118 MMHG | WEIGHT: 187 LBS | HEART RATE: 66 BPM | BODY MASS INDEX: 31.16 KG/M2 | OXYGEN SATURATION: 98 % | HEIGHT: 65 IN | RESPIRATION RATE: 16 BRPM

## 2019-07-10 DIAGNOSIS — R39.9 UTI SYMPTOMS: Primary | ICD-10-CM

## 2019-07-10 PROCEDURE — 87086 URINE CULTURE/COLONY COUNT: CPT | Performed by: FAMILY MEDICINE

## 2019-07-10 PROCEDURE — 87186 SC STD MICRODIL/AGAR DIL: CPT | Performed by: FAMILY MEDICINE

## 2019-07-10 PROCEDURE — 99213 OFFICE O/P EST LOW 20 MIN: CPT | Performed by: FAMILY MEDICINE

## 2019-07-10 PROCEDURE — 87077 CULTURE AEROBIC IDENTIFY: CPT | Performed by: FAMILY MEDICINE

## 2019-07-10 RX ORDER — SULFAMETHOXAZOLE AND TRIMETHOPRIM 800; 160 MG/1; MG/1
1 TABLET ORAL EVERY 12 HOURS SCHEDULED
Qty: 6 TABLET | Refills: 0 | Status: SHIPPED | COMMUNITY
Start: 2019-07-10 | End: 2019-07-13

## 2019-07-10 NOTE — PROGRESS NOTES
[facility]        NAME: Jessica Jones is a [address] [place] y o  female  : 1965    MRN: [id number]  DATE: July 10, 2019  TIME: 10:06 AM    Assessment and Plan   UTI symptoms [R39 9]  1  UTI symptoms  Urine culture    sulfamethoxazole-trimethoprim (BACTRIM DS) 800-160 mg per tablet         Patient Instructions     Patient Instructions   1  UTI symptoms   - unable to dip urine in office, sample sent for culture testing   - Bactrim DS x 3 days prescribed, complete as directed (dispensed in office)  - drink plenty of fluids   - may continue AZO as needed  - follow up w/ PCP for re-check in 1 week  - if symptoms persist despite treatment, worsen, or any new symptoms present, should be seen in the ER       Sulfamethoxazole/Trimethoprim (By mouth)   Sulfamethoxazole (sul-fa-meth-OX-a-zole), Trimethoprim (trye-METH-oh-prim)  Treats or prevents infections  Brand Name(s): Bactrim, Bactrim DS, SMZ-TMP Pediatric, Sulfatrim, Sulfatrim Pediatric   There may be other brand names for this medicine  When This Medicine Should Not Be Used: This medicine is not right for everyone  Do not use it if you had an allergic reaction to trimethoprim, sulfamethoxazole, or any sulfa drug  Do not use this medicine if you are pregnant, if you have anemia caused by low levels of folic acid, or if you have a history of drug-induced thrombocytopenia  How to Use This Medicine:   Liquid, Tablet  · Your doctor will tell you how much medicine to use  Do not use more than directed  · Measure the oral liquid medicine with a marked measuring spoon, oral syringe, or medicine cup  · Drink extra fluids so you will urinate more often and help prevent kidney problems  · Take all of the medicine in your prescription to clear up your infection, even if you feel better after the first few doses  · Missed dose: Take a dose as soon as you remember  If it is almost time for your next dose, wait until then and take a regular dose   Do not take extra medicine to make up for a missed dose  · Store the medicine in a closed container at room temperature, away from heat, moisture, and direct light  Do not freeze the oral liquid  Drugs and Foods to Avoid:   Ask your doctor or pharmacist before using any other medicine, including over-the-counter medicines, vitamins, and herbal products  · Some medicines can affect how this medicine works  Tell your doctor if you also use the following:   ¨ amantadine, cyclosporine, digoxin, indomethacin, memantine, methotrexate, phenytoin, pyrimethamine, or warfarin  ¨ an ACE inhibitor, diabetes medicine (glipizide, glyburide, metformin, pioglitazone, repaglinide, rosiglitazone), a diuretic (water pill, such as hydrochlorothiazide), or a tricyclic antidepressant  Warnings While Using This Medicine:   · It is not safe to take this medicine during pregnancy  It could harm an unborn baby  Tell your doctor right away if you become pregnant  · Tell your doctor if you are breastfeeding, or if you have kidney disease, liver disease, diabetes, malabsorption or malnutrition, folate deficiency, porphyria, thyroid problems, or a history of alcoholism  Tell your doctor if you have asthma or severe allergies, especially if you are allergic to any medicines  It is important for your doctor to know if you have HIV or AIDS, because this medicine might work differently for you  · This medicine may cause a severe allergic reaction  · This medicine may lower the number of platelets in your body, which are necessary for proper blood clotting  This may cause you to bleed or get infections more easily  Talk with your doctor if you have concerns about this  · This medicine can cause diarrhea  Call your doctor if the diarrhea becomes severe, does not stop, or is bloody  Do not take any medicine to stop diarrhea until you have talked to your doctor  Diarrhea can occur 2 months or more after you stop taking this medicine    · Tell any doctor or dentist who treats you that you are using this medicine  This medicine may affect certain medical test results  · Your doctor will do lab tests at regular visits to check on the effects of this medicine  Keep all appointments  · Keep all medicine out of the reach of children  Never share your medicine with anyone  Possible Side Effects While Using This Medicine:   Call your doctor right away if you notice any of these side effects:  · Allergic reaction: Itching or hives, swelling in your face or hands, swelling or tingling in your mouth or throat, chest tightness, trouble breathing  · Blistering, peeling, or red skin rash  · Dark urine or pale stools, nausea, vomiting, loss of appetite, stomach pain, yellow skin or eyes  · Chest pain, cough, or trouble breathing  · Confusion, weakness  · Muscle twitching  · Severe diarrhea, stomach pain, cramps, bloating  · Skin rash, purple spots on your skin, or very pale or yellow skin  · Sore throat, fever, muscle pain  · Uneven heartbeat, numbness or tingling in your hands, feet, or lips  · Unusual bleeding, bruising, or weakness  If you notice these less serious side effects, talk with your doctor:   · Mild nausea, vomiting, or loss of appetite  If you notice other side effects that you think are caused by this medicine, tell your doctor  Call your doctor for medical advice about side effects  You may report side effects to FDA at 5-508-FDA-8203  © 2017 [address] [employer] Information is for End User's use only and may not be sold, redistributed or otherwise used for commercial purposes  The above information is an  only  It is not intended as medical advice for individual conditions or treatments  Talk to your doctor, nurse or pharmacist before following any medical regimen to see if it is safe and effective for you  Follow up with PCP in 5-7 days  Proceed to  ER if symptoms worsen      Chief Complaint     Chief Complaint   Patient presents with   [place] Possible UTI     Pt states  concerns  UTI x 2 days pt states   pressure,   voiding small amounts,  odor,  and urine is cloudy  Pt used AZO  History of Present Illness       51 yo female presents with concerns for a UTI  She is experiencing urinary urgency, frequency, and suprapubic pressure x 2 days  She states her urine appears cloudy and has a strong odor to it  No blood in the urine  No nausea/vomiting or diarrhea  No fever/chills  No abdominal or pelvic pain  No flank/CVA pain  No vaginal bleeding or discharge  She has been taking AZO as needed for the symptoms  Patient was seen in our office in June 2019 for UTI symptoms, urine culture showed mild growth of E  Coli , patient was treated with Macrobid, she states she completed the Rx as prescribed and her symptoms completely resolved  Review of Systems   Review of Systems   Constitutional: Negative  Respiratory: Negative  Cardiovascular: Negative  Gastrointestinal: Negative  Genitourinary:        As noted in HPI   Musculoskeletal: Negative  Skin: Negative            Current Medications       Current Outpatient Medications:     ALPRAZolam ER (XANAX XR) 0 5 MG 24 hr tablet, as needed  , Disp: , Rfl:     buPROPion (WELLBUTRIN XL) 300 mg 24 hr tablet, Take 300 mg by mouth every morning  , Disp: , Rfl:     escitalopram (LEXAPRO) 10 mg tablet, Take 10 mg by mouth daily at bedtime  , Disp: , Rfl:     ezetimibe-simvastatin (VYTORIN) 10-20 mg per tablet, Take 1 tablet by mouth daily (Patient taking differently: Take 1 tablet by mouth daily at bedtime  ), Disp: 90 tablet, Rfl: 2    fexofenadine (ALLEGRA) 30 MG tablet, Take 30 mg by mouth as needed  , Disp: , Rfl:     metoprolol succinate (TOPROL-XL) 25 mg 24 hr tablet, Take 1 tablet (25 mg total) by mouth daily at bedtime, Disp: 90 tablet, Rfl: 3    sulfamethoxazole-trimethoprim (BACTRIM DS) 800-160 mg per tablet, Take 1 tablet by mouth every 12 (twelve) hours for 3 days, Disp: 6 tablet, Rfl: 0    Current Allergies     Allergies as of 07/10/2019 - Reviewed 07/10/2019   Allergen Reaction Noted    Prednisone Anxiety 02/14/2012            The following portions of the patient's history were reviewed and updated as appropriate: allergies, current medications, past family history, past medical history, past social history, past surgical history and problem list      Past Medical History:   Diagnosis Date    Depression     Hyperlipidemia     Hypertension     Orthostatic hypotension 2011    Other specified anxiety disorders     PONV (postoperative nausea and vomiting)     Syncope     last episode 8/2018       Past Surgical History:   Procedure Laterality Date    CATARACT EXTRACTION Bilateral     COLONOSCOPY      DILATION AND EVACUATION      ENDOMETRIAL BIOPSY  03/2017    SC COLONOSCOPY FLX DX W/COLLJ SPEC WHEN PFRMD N/A 11/2/2018    Procedure: COLONOSCOPY;  Surgeon: Teressa Reyes MD;  Location: [facility]; Service: Gastroenterology       Family History   Problem Relation Age of Onset    Diabetes Mother     Hypertension Mother     Hyperlipidemia Mother     Hypertension Father     Hyperlipidemia Sister     Hypertension Sister     Diabetes Maternal Grandmother     No Known Problems Brother     No Known Problems Maternal Aunt     No Known Problems Maternal Uncle     No Known Problems Paternal Aunt     No Known Problems Paternal Uncle     No Known Problems Maternal Grandfather     No Known Problems Paternal Grandmother     No Known Problems Paternal Grandfather     ADD / ADHD Neg Hx     Anesthesia problems Neg Hx     Cancer Neg Hx     Clotting disorder Neg Hx     Collagen disease Neg Hx     Dislocations Neg Hx     Learning disabilities Neg Hx     Neurological problems Neg Hx     Osteoporosis Neg Hx     Rheumatologic disease Neg Hx     Scoliosis Neg Hx     Vascular Disease Neg Hx          Medications have been verified          Objective   /68 (BP Location: Right arm, Patient Position: Sitting, Cuff Size: Standard)   Pulse 66   Temp 98 6 °F (37 °C) (Tympanic)   Resp 16   Ht 5' 5" (1 651 m)   Wt 84 8 kg (187 lb)   SpO2 98%   BMI 31 12 kg/m²        Physical Exam     Physical Exam   Constitutional: She is oriented to person, place, and time  Vital signs are normal  She appears well-developed and well-nourished  She is active and cooperative  Non-toxic appearance  She does not have a sickly appearance  She does not appear ill  No distress  Abdominal: Soft  Normal appearance  She exhibits no distension  There is no hepatosplenomegaly  There is no tenderness  There is no rigidity, no rebound, no guarding and no CVA tenderness  Neurological: She is alert and oriented to person, place, and time  Skin: She is not diaphoretic  Psychiatric: She has a normal mood and affect  Her behavior is normal  Judgment and thought content normal    Nursing note and vitals reviewed

## 2019-07-10 NOTE — PATIENT INSTRUCTIONS
1  UTI symptoms   - unable to dip urine in office, sample sent for culture testing   - Bactrim DS x 3 days prescribed, complete as directed (dispensed in office)  - drink plenty of fluids   - may continue AZO as needed  - follow up w/ PCP for re-check in 1 week  - if symptoms persist despite treatment, worsen, or any new symptoms present, should be seen in the ER       Sulfamethoxazole/Trimethoprim (By mouth)   Sulfamethoxazole (sul-fa-meth-OX-a-zole), Trimethoprim (trye-METH-oh-prim)  Treats or prevents infections  Brand Name(s): Bactrim, Bactrim DS, SMZ-TMP Pediatric, Sulfatrim, Sulfatrim Pediatric   There may be other brand names for this medicine  When This Medicine Should Not Be Used: This medicine is not right for everyone  Do not use it if you had an allergic reaction to trimethoprim, sulfamethoxazole, or any sulfa drug  Do not use this medicine if you are pregnant, if you have anemia caused by low levels of folic acid, or if you have a history of drug-induced thrombocytopenia  How to Use This Medicine:   Liquid, Tablet  · Your doctor will tell you how much medicine to use  Do not use more than directed  · Measure the oral liquid medicine with a marked measuring spoon, oral syringe, or medicine cup  · Drink extra fluids so you will urinate more often and help prevent kidney problems  · Take all of the medicine in your prescription to clear up your infection, even if you feel better after the first few doses  · Missed dose: Take a dose as soon as you remember  If it is almost time for your next dose, wait until then and take a regular dose  Do not take extra medicine to make up for a missed dose  · Store the medicine in a closed container at room temperature, away from heat, moisture, and direct light  Do not freeze the oral liquid  Drugs and Foods to Avoid:   Ask your doctor or pharmacist before using any other medicine, including over-the-counter medicines, vitamins, and herbal products    · Some medicines can affect how this medicine works  Tell your doctor if you also use the following:   ¨ amantadine, cyclosporine, digoxin, indomethacin, memantine, methotrexate, phenytoin, pyrimethamine, or warfarin  ¨ an ACE inhibitor, diabetes medicine (glipizide, glyburide, metformin, pioglitazone, repaglinide, rosiglitazone), a diuretic (water pill, such as hydrochlorothiazide), or a tricyclic antidepressant  Warnings While Using This Medicine:   · It is not safe to take this medicine during pregnancy  It could harm an unborn baby  Tell your doctor right away if you become pregnant  · Tell your doctor if you are breastfeeding, or if you have kidney disease, liver disease, diabetes, malabsorption or malnutrition, folate deficiency, porphyria, thyroid problems, or a history of alcoholism  Tell your doctor if you have asthma or severe allergies, especially if you are allergic to any medicines  It is important for your doctor to know if you have HIV or AIDS, because this medicine might work differently for you  · This medicine may cause a severe allergic reaction  · This medicine may lower the number of platelets in your body, which are necessary for proper blood clotting  This may cause you to bleed or get infections more easily  Talk with your doctor if you have concerns about this  · This medicine can cause diarrhea  Call your doctor if the diarrhea becomes severe, does not stop, or is bloody  Do not take any medicine to stop diarrhea until you have talked to your doctor  Diarrhea can occur 2 months or more after you stop taking this medicine  · Tell any doctor or dentist who treats you that you are using this medicine  This medicine may affect certain medical test results  · Your doctor will do lab tests at regular visits to check on the effects of this medicine  Keep all appointments  · Keep all medicine out of the reach of children  Never share your medicine with anyone    Possible Side Effects While Using This Medicine:   Call your doctor right away if you notice any of these side effects:  · Allergic reaction: Itching or hives, swelling in your face or hands, swelling or tingling in your mouth or throat, chest tightness, trouble breathing  · Blistering, peeling, or red skin rash  · Dark urine or pale stools, nausea, vomiting, loss of appetite, stomach pain, yellow skin or eyes  · Chest pain, cough, or trouble breathing  · Confusion, weakness  · Muscle twitching  · Severe diarrhea, stomach pain, cramps, bloating  · Skin rash, purple spots on your skin, or very pale or yellow skin  · Sore throat, fever, muscle pain  · Uneven heartbeat, numbness or tingling in your hands, feet, or lips  · Unusual bleeding, bruising, or weakness  If you notice these less serious side effects, talk with your doctor:   · Mild nausea, vomiting, or loss of appetite  If you notice other side effects that you think are caused by this medicine, tell your doctor  Call your doctor for medical advice about side effects  You may report side effects to FDA at 0-981-FDA-2207  © 2017 [address] Information is for End User's use only and may not be sold, redistributed or otherwise used for commercial purposes  The above information is an  only  It is not intended as medical advice for individual conditions or treatments  Talk to your doctor, nurse or pharmacist before following any medical regimen to see if it is safe and effective for you

## 2019-07-12 LAB
BACTERIA UR CULT: ABNORMAL
BACTERIA UR CULT: ABNORMAL

## 2019-07-23 ENCOUNTER — OFFICE VISIT (OUTPATIENT)
Dept: FAMILY MEDICINE CLINIC | Facility: CLINIC | Age: 54
End: 2019-07-23
Payer: COMMERCIAL

## 2019-07-23 VITALS — WEIGHT: 185 LBS | TEMPERATURE: 98.6 F | HEIGHT: 65 IN | BODY MASS INDEX: 30.82 KG/M2

## 2019-07-23 DIAGNOSIS — L23.7 POISON IVY DERMATITIS: Primary | ICD-10-CM

## 2019-07-23 PROBLEM — R39.9 UTI SYMPTOMS: Status: RESOLVED | Noted: 2019-06-17 | Resolved: 2019-07-23

## 2019-07-23 PROCEDURE — 99213 OFFICE O/P EST LOW 20 MIN: CPT | Performed by: FAMILY MEDICINE

## 2019-07-23 RX ORDER — HYDROXYZINE HYDROCHLORIDE 25 MG/1
25 TABLET, FILM COATED ORAL EVERY 6 HOURS PRN
Qty: 20 TABLET | Refills: 0 | Status: SHIPPED | OUTPATIENT
Start: 2019-07-23 | End: 2019-07-30 | Stop reason: SDUPTHER

## 2019-07-23 RX ORDER — METHYLPREDNISOLONE 4 MG/1
TABLET ORAL
Qty: 21 EACH | Refills: 0 | Status: SHIPPED | OUTPATIENT
Start: 2019-07-23 | End: 2019-08-01

## 2019-07-23 NOTE — PROGRESS NOTES
Chief Complaint   Patient presents with    Rash        Patient ID: Kayden Huerta is a 52 y o  female  HPI  Pt is seeing for itchy rash on both forearms, abd and back x 2 days, no therapy tried, was exposed to poison ivy few days prior, had reaction to poison ivy before     The following portions of the patient's history were reviewed and updated as appropriate: allergies, current medications, past family history, past medical history, past social history, past surgical history and problem list     Review of Systems   Constitutional: Negative  Respiratory: Negative  Cardiovascular: Negative  Gastrointestinal: Negative  Current Outpatient Medications   Medication Sig Dispense Refill    ALPRAZolam ER (XANAX XR) 0 5 MG 24 hr tablet as needed        buPROPion (WELLBUTRIN XL) 300 mg 24 hr tablet Take 300 mg by mouth every morning        escitalopram (LEXAPRO) 10 mg tablet Take 10 mg by mouth daily at bedtime        ezetimibe-simvastatin (VYTORIN) 10-20 mg per tablet Take 1 tablet by mouth daily (Patient taking differently: Take 1 tablet by mouth daily at bedtime  ) 90 tablet 2    fexofenadine (ALLEGRA) 30 MG tablet Take 30 mg by mouth as needed        metoprolol succinate (TOPROL-XL) 25 mg 24 hr tablet Take 1 tablet (25 mg total) by mouth daily at bedtime 90 tablet 3     No current facility-administered medications for this visit  Objective:    Temp 98 6 °F (37 °C) (Tympanic)   Ht 5' 5" (1 651 m)   Wt 83 9 kg (185 lb)   BMI 30 79 kg/m²        Physical Exam   Constitutional: She is oriented to person, place, and time  Musculoskeletal: She exhibits no edema  Neurological: She is alert and oriented to person, place, and time  Skin: Rash (maculovesicular elemenst  -  few on both forearms, sporadic on abd and back ) noted  Assessment/Plan:         Diagnoses and all orders for this visit:    Poison ivy dermatitis  -     hydrOXYzine HCL (ATARAX) 25 mg tablet;  Take 1 tablet (25 mg total) by mouth every 6 (six) hours as needed for itching  -     methylPREDNISolone 4 MG tablet therapy pack; Use as directed on package            BMI Counseling: Body mass index is 30 79 kg/m²  Discussed the patient's BMI with her  The BMI is above average  BMI counseling and education was provided to the patient  Nutrition recommendations include reducing portion sizes, decreasing overall calorie intake, 3-5 servings of fruits/vegetables daily and reducing fast food intake  Exercise recommendations include moderate aerobic physical activity for 150 minutes/week           rto for Germain Gilman MD

## 2019-07-30 DIAGNOSIS — L23.7 POISON IVY DERMATITIS: ICD-10-CM

## 2019-07-31 RX ORDER — HYDROXYZINE HYDROCHLORIDE 25 MG/1
25 TABLET, FILM COATED ORAL EVERY 6 HOURS PRN
Qty: 20 TABLET | Refills: 0 | Status: SHIPPED | OUTPATIENT
Start: 2019-07-31 | End: 2020-02-26

## 2019-08-01 ENCOUNTER — OFFICE VISIT (OUTPATIENT)
Dept: FAMILY MEDICINE CLINIC | Facility: CLINIC | Age: 54
End: 2019-08-01
Payer: COMMERCIAL

## 2019-08-01 VITALS
HEIGHT: 65 IN | SYSTOLIC BLOOD PRESSURE: 110 MMHG | HEART RATE: 68 BPM | BODY MASS INDEX: 31.49 KG/M2 | RESPIRATION RATE: 16 BRPM | TEMPERATURE: 98.4 F | WEIGHT: 189 LBS | DIASTOLIC BLOOD PRESSURE: 70 MMHG

## 2019-08-01 DIAGNOSIS — L23.7 POISON IVY DERMATITIS: Primary | ICD-10-CM

## 2019-08-01 PROCEDURE — 3008F BODY MASS INDEX DOCD: CPT | Performed by: FAMILY MEDICINE

## 2019-08-01 PROCEDURE — 99213 OFFICE O/P EST LOW 20 MIN: CPT | Performed by: FAMILY MEDICINE

## 2019-08-01 RX ORDER — CLOBETASOL PROPIONATE 0.5 MG/G
CREAM TOPICAL 2 TIMES DAILY
Qty: 30 G | Refills: 0 | Status: SHIPPED | OUTPATIENT
Start: 2019-08-01 | End: 2020-02-26

## 2019-08-02 NOTE — PROGRESS NOTES
Assessment/Plan:    No problem-specific Assessment & Plan notes found for this encounter  Diagnoses and all orders for this visit:    Poison ivy dermatitis  -     clobetasol (TEMOVATE) 0 05 % cream; Apply topically 2 (two) times a day    At this time, will try topical steroid cream as areas are sporadic  Patient also advised to use calamine lotion to help  Patient agrees with plan and discussed avoiding itching to prevent scarring  It was also explained not to use clobetasol on thin area of skin including face, hands, and other areas as it can cause discoloration and damage to the skin  Patient agrees with plan  If no improvement, patient should return  Monitor for any signs of allergies with topical steroids  Subjective:      Patient ID: Kiah Fuller is a 52 y o  female  13-year-old female presents for an acute appointment  Patient was seen in the office on July 23, 2019 where she was diagnosed with poison ivy dermatitis and was given prednisone and Atarax for the itching  Patient states that she does notice some improvement but she does still have the poison ivy dermatitis along with significant itching  Patient thinks that she got up from her dog where she did have poison ivy around her house and the bushes are no longer there  She Has not noticed that the rash has spread at all  It is located on her stomach, back, and arms  She did have an allergic reaction a long time ago to prednisone but this time she had oral prednisone she has no reactions including difficulty breathing, hives, or rashes  No fevers or chills  The following portions of the patient's history were reviewed and updated as appropriate: allergies, current medications, past family history, past medical history, past social history, past surgical history and problem list     Review of Systems   Constitutional: Negative for appetite change and fever  HENT: Negative for ear pain and sore throat      Eyes: Negative for visual disturbance  Respiratory: Negative for shortness of breath  Cardiovascular: Negative for chest pain and leg swelling  Gastrointestinal: Negative for abdominal pain  Genitourinary: Negative for difficulty urinating  Musculoskeletal: Negative for arthralgias  Skin: Positive for rash  Negative for color change  Neurological: Negative for dizziness, tremors, light-headedness and headaches  Psychiatric/Behavioral: Negative for agitation and behavioral problems  Objective:      /70 (BP Location: Left arm, Patient Position: Sitting, Cuff Size: Standard)   Pulse 68   Temp 98 4 °F (36 9 °C)   Resp 16   Ht 5' 5" (1 651 m)   Wt 85 7 kg (189 lb)   BMI 31 45 kg/m²          Physical Exam   Constitutional: She is oriented to person, place, and time  She appears well-developed and well-nourished  No distress  HENT:   Head: Normocephalic and atraumatic  Nose: Nose normal    Eyes: EOM are normal  Right eye exhibits no discharge  Left eye exhibits no discharge  Cardiovascular: Normal rate, regular rhythm, normal heart sounds and intact distal pulses  No murmur heard  Pulmonary/Chest: Effort normal and breath sounds normal  No respiratory distress  Abdominal: Soft  Bowel sounds are normal  She exhibits no distension  There is no tenderness  Musculoskeletal: Normal range of motion  Neurological: She is alert and oriented to person, place, and time  Skin: Skin is warm  Rash noted  No rash noted on her arms at this time  Sporadic maculovesicular rash noted on her abdomen and back  Psychiatric: She has a normal mood and affect   Her behavior is normal

## 2020-02-26 ENCOUNTER — OFFICE VISIT (OUTPATIENT)
Dept: FAMILY MEDICINE CLINIC | Facility: CLINIC | Age: 55
End: 2020-02-26
Payer: COMMERCIAL

## 2020-02-26 VITALS
RESPIRATION RATE: 16 BRPM | DIASTOLIC BLOOD PRESSURE: 88 MMHG | WEIGHT: 188.2 LBS | BODY MASS INDEX: 31.36 KG/M2 | SYSTOLIC BLOOD PRESSURE: 118 MMHG | HEART RATE: 88 BPM | HEIGHT: 65 IN | TEMPERATURE: 97.5 F

## 2020-02-26 DIAGNOSIS — I10 ESSENTIAL HYPERTENSION: Primary | ICD-10-CM

## 2020-02-26 DIAGNOSIS — F32.A DEPRESSION, UNSPECIFIED DEPRESSION TYPE: ICD-10-CM

## 2020-02-26 DIAGNOSIS — Z12.31 ENCOUNTER FOR SCREENING MAMMOGRAM FOR MALIGNANT NEOPLASM OF BREAST: ICD-10-CM

## 2020-02-26 DIAGNOSIS — Z13.29 SCREENING FOR ENDOCRINE DISORDER: ICD-10-CM

## 2020-02-26 PROCEDURE — 1036F TOBACCO NON-USER: CPT | Performed by: FAMILY MEDICINE

## 2020-02-26 PROCEDURE — 3008F BODY MASS INDEX DOCD: CPT | Performed by: FAMILY MEDICINE

## 2020-02-26 PROCEDURE — 99213 OFFICE O/P EST LOW 20 MIN: CPT | Performed by: FAMILY MEDICINE

## 2020-02-26 PROCEDURE — 3074F SYST BP LT 130 MM HG: CPT | Performed by: FAMILY MEDICINE

## 2020-02-26 PROCEDURE — 3079F DIAST BP 80-89 MM HG: CPT | Performed by: FAMILY MEDICINE

## 2020-02-26 RX ORDER — BUPROPION HYDROCHLORIDE 300 MG/1
300 TABLET ORAL EVERY MORNING
Qty: 90 TABLET | Refills: 3 | Status: SHIPPED | OUTPATIENT
Start: 2020-02-26 | End: 2020-12-16 | Stop reason: SDUPTHER

## 2020-02-26 RX ORDER — METOPROLOL SUCCINATE 25 MG/1
25 TABLET, EXTENDED RELEASE ORAL
Qty: 90 TABLET | Refills: 3 | Status: SHIPPED | OUTPATIENT
Start: 2020-02-26

## 2020-02-26 NOTE — PROGRESS NOTES
Chief Complaint   Patient presents with    Follow-up     dicuss wellbutrin for her derpression        Patient ID: Alejandro Murrell is a 62 y o  female  HPI  Pt is seeing for f/u Depression and HTN -  Both stable, needs refill for Wellbutrin -  Cannot see psych anymore 2 to insurance changes  -  Stopped Vytorin 2 to side effects several months ago     The following portions of the patient's history were reviewed and updated as appropriate: allergies, current medications, past family history, past medical history, past social history, past surgical history and problem list     Review of Systems    Current Outpatient Medications   Medication Sig Dispense Refill    buPROPion (WELLBUTRIN XL) 300 mg 24 hr tablet Take 1 tablet (300 mg total) by mouth every morning 90 tablet 3    metoprolol succinate (TOPROL-XL) 25 mg 24 hr tablet Take 1 tablet (25 mg total) by mouth daily at bedtime 90 tablet 3    fexofenadine (ALLEGRA) 30 MG tablet Take 30 mg by mouth as needed         No current facility-administered medications for this visit  Objective:    /88 (BP Location: Right arm, Patient Position: Sitting, Cuff Size: Large)   Pulse 88   Temp 97 5 °F (36 4 °C)   Resp 16   Ht 5' 5" (1 651 m)   Wt 85 4 kg (188 lb 3 2 oz)   BMI 31 32 kg/m²        Physical Exam   Constitutional: She is oriented to person, place, and time  Cardiovascular: Normal rate  Musculoskeletal: She exhibits no edema  Neurological: She is alert and oriented to person, place, and time  Psychiatric: She has a normal mood and affect  Her behavior is normal          Labs in chart were reviewed  Assessment/Plan:         Diagnoses and all orders for this visit:    Essential hypertension  -     metoprolol succinate (TOPROL-XL) 25 mg 24 hr tablet; Take 1 tablet (25 mg total) by mouth daily at bedtime  -     Comprehensive metabolic panel; Future  -     Lipid panel; Future  -     TSH, 3rd generation;  Future  -     Comprehensive metabolic panel  -     Lipid panel  -     TSH, 3rd generation    Encounter for screening mammogram for malignant neoplasm of breast  -     Mammo screening bilateral w cad; Future    Depression, unspecified depression type  -     buPROPion (WELLBUTRIN XL) 300 mg 24 hr tablet; Take 1 tablet (300 mg total) by mouth every morning    Screening for endocrine disorder  -     Hemoglobin A1C; Future  -     Hemoglobin A1C            BMI Counseling: Body mass index is 31 32 kg/m²  Discussed the patient's BMI with her  The BMI is above normal  Nutrition recommendations include reducing portion sizes, decreasing overall calorie intake, 3-5 servings of fruits/vegetables daily, reducing fast food intake and consuming healthier snacks  Exercise recommendations include exercising 3-5 times per week           rto for Ruthie Dupree MD

## 2020-02-28 LAB — HBA1C MFR BLD HPLC: 5.6 %

## 2020-02-29 LAB
ALBUMIN SERPL-MCNC: 4.6 G/DL (ref 3.8–4.9)
ALBUMIN/GLOB SERPL: 2 {RATIO} (ref 1.2–2.2)
ALP SERPL-CCNC: 94 IU/L (ref 39–117)
ALT SERPL-CCNC: 19 IU/L (ref 0–32)
AST SERPL-CCNC: 21 IU/L (ref 0–40)
BILIRUB SERPL-MCNC: 0.4 MG/DL (ref 0–1.2)
BUN SERPL-MCNC: 16 MG/DL (ref 6–24)
BUN/CREAT SERPL: 16 (ref 9–23)
CALCIUM SERPL-MCNC: 9.7 MG/DL (ref 8.7–10.2)
CHLORIDE SERPL-SCNC: 100 MMOL/L (ref 96–106)
CHOLEST SERPL-MCNC: 234 MG/DL (ref 100–199)
CHOLEST/HDLC SERPL: 5.7 RATIO (ref 0–4.4)
CO2 SERPL-SCNC: 24 MMOL/L (ref 20–29)
CREAT SERPL-MCNC: 1.03 MG/DL (ref 0.57–1)
EST. AVERAGE GLUCOSE BLD GHB EST-MCNC: 114 MG/DL
GLOBULIN SER-MCNC: 2.3 G/DL (ref 1.5–4.5)
GLUCOSE SERPL-MCNC: 93 MG/DL (ref 65–99)
HBA1C MFR BLD: 5.6 % (ref 4.8–5.6)
HDLC SERPL-MCNC: 41 MG/DL
LDLC SERPL CALC-MCNC: 157 MG/DL (ref 0–99)
MICRODELETION SYND BLD/T FISH: NORMAL
POTASSIUM SERPL-SCNC: 5 MMOL/L (ref 3.5–5.2)
PROT SERPL-MCNC: 6.9 G/DL (ref 6–8.5)
SL AMB EGFR AFRICAN AMERICAN: 70 ML/MIN/1.73
SL AMB EGFR NON AFRICAN AMERICAN: 61 ML/MIN/1.73
SL AMB VLDL CHOLESTEROL CALC: 36 MG/DL (ref 5–40)
SODIUM SERPL-SCNC: 139 MMOL/L (ref 134–144)
TRIGL SERPL-MCNC: 178 MG/DL (ref 0–149)
TSH SERPL DL<=0.005 MIU/L-ACNC: 1.34 UIU/ML (ref 0.45–4.5)

## 2020-03-03 ENCOUNTER — TELEPHONE (OUTPATIENT)
Dept: FAMILY MEDICINE CLINIC | Facility: CLINIC | Age: 55
End: 2020-03-03

## 2020-04-29 ENCOUNTER — OFFICE VISIT (OUTPATIENT)
Dept: URGENT CARE | Facility: CLINIC | Age: 55
End: 2020-04-29
Payer: COMMERCIAL

## 2020-04-29 VITALS
OXYGEN SATURATION: 96 % | RESPIRATION RATE: 18 BRPM | HEIGHT: 65 IN | WEIGHT: 183.6 LBS | BODY MASS INDEX: 30.59 KG/M2 | HEART RATE: 81 BPM | TEMPERATURE: 97.4 F | SYSTOLIC BLOOD PRESSURE: 140 MMHG | DIASTOLIC BLOOD PRESSURE: 84 MMHG

## 2020-04-29 DIAGNOSIS — M79.10 MYALGIA: ICD-10-CM

## 2020-04-29 DIAGNOSIS — Z20.822 EXPOSURE TO COVID-19 VIRUS: ICD-10-CM

## 2020-04-29 DIAGNOSIS — R50.9 FEVER, UNSPECIFIED: Primary | ICD-10-CM

## 2020-04-29 PROCEDURE — 3079F DIAST BP 80-89 MM HG: CPT | Performed by: PHYSICIAN ASSISTANT

## 2020-04-29 PROCEDURE — 1036F TOBACCO NON-USER: CPT | Performed by: PHYSICIAN ASSISTANT

## 2020-04-29 PROCEDURE — 3077F SYST BP >= 140 MM HG: CPT | Performed by: PHYSICIAN ASSISTANT

## 2020-04-29 PROCEDURE — 3008F BODY MASS INDEX DOCD: CPT | Performed by: PHYSICIAN ASSISTANT

## 2020-04-29 PROCEDURE — 99213 OFFICE O/P EST LOW 20 MIN: CPT | Performed by: PHYSICIAN ASSISTANT

## 2020-04-29 PROCEDURE — U0003 INFECTIOUS AGENT DETECTION BY NUCLEIC ACID (DNA OR RNA); SEVERE ACUTE RESPIRATORY SYNDROME CORONAVIRUS 2 (SARS-COV-2) (CORONAVIRUS DISEASE [COVID-19]), AMPLIFIED PROBE TECHNIQUE, MAKING USE OF HIGH THROUGHPUT TECHNOLOGIES AS DESCRIBED BY CMS-2020-01-R: HCPCS | Performed by: PHYSICIAN ASSISTANT

## 2020-05-01 LAB — SARS-COV-2 RNA SPEC QL NAA+PROBE: NOT DETECTED

## 2020-09-29 ENCOUNTER — OFFICE VISIT (OUTPATIENT)
Dept: URGENT CARE | Facility: CLINIC | Age: 55
End: 2020-09-29
Payer: COMMERCIAL

## 2020-09-29 VITALS
WEIGHT: 180 LBS | OXYGEN SATURATION: 99 % | RESPIRATION RATE: 18 BRPM | HEART RATE: 74 BPM | BODY MASS INDEX: 29.99 KG/M2 | HEIGHT: 65 IN | SYSTOLIC BLOOD PRESSURE: 110 MMHG | TEMPERATURE: 98.7 F | DIASTOLIC BLOOD PRESSURE: 64 MMHG

## 2020-09-29 DIAGNOSIS — R39.9 UTI SYMPTOMS: Primary | ICD-10-CM

## 2020-09-29 DIAGNOSIS — R30.0 DYSURIA: ICD-10-CM

## 2020-09-29 DIAGNOSIS — N30.00 ACUTE CYSTITIS WITHOUT HEMATURIA: ICD-10-CM

## 2020-09-29 LAB
SL AMB  POCT GLUCOSE, UA: ABNORMAL
SL AMB LEUKOCYTE ESTERASE,UA: ABNORMAL
SL AMB POCT BILIRUBIN,UA: ABNORMAL
SL AMB POCT BLOOD,UA: ABNORMAL
SL AMB POCT CLARITY,UA: ABNORMAL
SL AMB POCT COLOR,UA: YELLOW
SL AMB POCT KETONES,UA: ABNORMAL
SL AMB POCT NITRITE,UA: ABNORMAL
SL AMB POCT PH,UA: 5
SL AMB POCT SPECIFIC GRAVITY,UA: 1.01
SL AMB POCT URINE PROTEIN: ABNORMAL
SL AMB POCT UROBILINOGEN: 0.2

## 2020-09-29 PROCEDURE — 81002 URINALYSIS NONAUTO W/O SCOPE: CPT | Performed by: PHYSICIAN ASSISTANT

## 2020-09-29 PROCEDURE — 87077 CULTURE AEROBIC IDENTIFY: CPT | Performed by: PHYSICIAN ASSISTANT

## 2020-09-29 PROCEDURE — 87186 SC STD MICRODIL/AGAR DIL: CPT | Performed by: PHYSICIAN ASSISTANT

## 2020-09-29 PROCEDURE — 99213 OFFICE O/P EST LOW 20 MIN: CPT | Performed by: PHYSICIAN ASSISTANT

## 2020-09-29 PROCEDURE — 3078F DIAST BP <80 MM HG: CPT | Performed by: PHYSICIAN ASSISTANT

## 2020-09-29 PROCEDURE — 1036F TOBACCO NON-USER: CPT | Performed by: PHYSICIAN ASSISTANT

## 2020-09-29 PROCEDURE — 87086 URINE CULTURE/COLONY COUNT: CPT | Performed by: PHYSICIAN ASSISTANT

## 2020-09-29 RX ORDER — SULFAMETHOXAZOLE AND TRIMETHOPRIM 800; 160 MG/1; MG/1
1 TABLET ORAL EVERY 12 HOURS SCHEDULED
Qty: 14 TABLET | Refills: 0 | Status: SHIPPED | OUTPATIENT
Start: 2020-09-29 | End: 2020-10-06

## 2020-09-29 RX ORDER — PHENAZOPYRIDINE HYDROCHLORIDE 200 MG/1
200 TABLET, FILM COATED ORAL
Qty: 10 TABLET | Refills: 0 | Status: SHIPPED | OUTPATIENT
Start: 2020-09-29 | End: 2021-05-08

## 2020-09-29 NOTE — PROGRESS NOTES
[facility]        NAME: Andrey Bryan is a 60 y o  female  : 1965    MRN: [id number]  DATE: 2020  TIME: 3:22 PM    Assessment and Plan   UTI symptoms [R39 9]  1  UTI symptoms  POCT urine dip    Urine culture    sulfamethoxazole-trimethoprim (BACTRIM DS) 800-160 mg per tablet    phenazopyridine (PYRIDIUM) 200 mg tablet   2  Acute cystitis without hematuria  sulfamethoxazole-trimethoprim (BACTRIM DS) 800-160 mg per tablet    phenazopyridine (PYRIDIUM) 200 mg tablet   3  Dysuria  phenazopyridine (PYRIDIUM) 200 mg tablet     Urine Dip positive for Large Leukocytes and trace protein  Cloudy  Patient Instructions   Please take Bactrim Twice a day for 7 days  If symptoms are not improving please follow up with healthcare provider  Pyridium TID x 10 tabs given for dysuria related to UTI  Will follow up with Urine Cx sensitivities to assure organism is susceptible to Bactrim  Follow up with PCP in 3-5 days  Proceed to  ER if symptoms worsen  Chief Complaint     Chief Complaint   Patient presents with    Possible UTI     Pt here for concerns of a UTI  pt states  ill x3 days,  cloudly urine, burning, voiding small amounts,  temp 100  No meds used  Ezekiel Goody History of Present Illness       HPI  Patient is a 59 yo female who presents with dysuria and cloudy urine x2 days  Denies hematuria  Denies flank pain, Denies vaginal discharge  Denies nausea or vomiting, fever, chills, shortness of breath, chest pain  Patient is sexually active with one male partner on and off over the past year with no condom use for contraception  She has prior hx of UTI in 2019 with culture growin 20-30K E  Coli sensitive to Macrobid which she was treated with  Less than 1 month later patient's UTI symptoms returned with Urine Cx growing >100K E  Coli and Staph saprophyticus sensitive to Bactrim which she was treated with       Review of Systems   Review of Systems  Per HPI    Current Medications Current Outpatient Medications:     buPROPion (WELLBUTRIN XL) 300 mg 24 hr tablet, Take 1 tablet (300 mg total) by mouth every morning, Disp: 90 tablet, Rfl: 3    fexofenadine (ALLEGRA) 30 MG tablet, Take 30 mg by mouth as needed  , Disp: , Rfl:     metoprolol succinate (TOPROL-XL) 25 mg 24 hr tablet, Take 1 tablet (25 mg total) by mouth daily at bedtime, Disp: 90 tablet, Rfl: 3    phenazopyridine (PYRIDIUM) 200 mg tablet, Take 1 tablet (200 mg total) by mouth 3 (three) times a day with meals, Disp: 10 tablet, Rfl: 0    sulfamethoxazole-trimethoprim (BACTRIM DS) 800-160 mg per tablet, Take 1 tablet by mouth every 12 (twelve) hours for 7 days, Disp: 14 tablet, Rfl: 0    Current Allergies     Allergies as of 09/29/2020 - Reviewed 09/29/2020   Allergen Reaction Noted    Prednisone Anxiety 02/14/2012            The following portions of the patient's history were reviewed and updated as appropriate: allergies, current medications, past family history, past medical history, past social history, past surgical history and problem list      Past Medical History:   Diagnosis Date    Depression     Hyperlipidemia     Hypertension     Orthostatic hypotension 2011    Other specified anxiety disorders     PONV (postoperative nausea and vomiting)     Syncope     last episode 8/2018       Past Surgical History:   Procedure Laterality Date    CATARACT EXTRACTION Bilateral     COLONOSCOPY      DILATION AND EVACUATION      ENDOMETRIAL BIOPSY  03/2017    [place] COLONOSCOPY FLX DX W/COLLJ SPEC WHEN PFRMD N/A 11/2/2018    Procedure: COLONOSCOPY;  Surgeon: Dinorah Toney MD;  Location: [facility];   Service: Gastroenterology       Family History   Problem Relation Age of Onset    Diabetes Mother     Hypertension Mother     Hyperlipidemia Mother     Hypertension Father     Hyperlipidemia Sister     Hypertension Sister     Diabetes Maternal Grandmother     No Known Problems Brother     No Known Problems Maternal Aunt     No Known Problems Maternal Uncle     No Known Problems Paternal Aunt     No Known Problems Paternal Uncle     No Known Problems Maternal Grandfather     No Known Problems Paternal Grandmother     No Known Problems Paternal Grandfather     ADD / ADHD Neg Hx     Anesthesia problems Neg Hx     Cancer Neg Hx     Clotting disorder Neg Hx     Collagen disease Neg Hx     Dislocations Neg Hx     Learning disabilities Neg Hx     Neurological problems Neg Hx     Osteoporosis Neg Hx     Rheumatologic disease Neg Hx     Scoliosis Neg Hx     Vascular Disease Neg Hx          Medications have been verified  Objective   /64 (BP Location: Right arm, Patient Position: Sitting, Cuff Size: Standard)   Pulse 74   Temp 98 7 °F (37 1 °C) (Tympanic)   Resp 18   Ht 5' 5" (1 651 m)   Wt 81 6 kg (180 lb)   SpO2 99%   BMI 29 95 kg/m²     Urine Dip positive for Large Leukocytes and trace protein  Cloudy  Physical Exam     Physical Exam  Constitutional:       Appearance: Normal appearance  Cardiovascular:      Rate and Rhythm: Normal rate and regular rhythm  Pulses: Normal pulses  Pulmonary:      Effort: Pulmonary effort is normal       Breath sounds: Normal breath sounds  Abdominal:      General: Abdomen is flat  Bowel sounds are normal       Palpations: Abdomen is soft  Tenderness: There is no abdominal tenderness  There is no right CVA tenderness, left CVA tenderness or guarding  Skin:     Capillary Refill: Capillary refill takes less than 2 seconds  Neurological:      General: No focal deficit present  Mental Status: She is alert and oriented to person, place, and time

## 2020-09-29 NOTE — PATIENT INSTRUCTIONS
Please take Bactrim Twice a day for 7 days  If symptoms are not improving please follow up with healthcare provider  Urinary Traction Infection in Older Adults   WHAT YOU NEED TO KNOW:   A urinary tract infection (UTI) is caused by bacteria that get inside your urinary tract  Your urinary tract includes your kidneys, ureters, bladder, and urethra  Urine is made in your kidneys, and it flows from the ureters to the bladder  Urine leaves the bladder through the urethra  A UTI is more common in your lower urinary tract, which includes your bladder and urethra  DISCHARGE INSTRUCTIONS:   Return to the emergency department if:   · You are urinating very little or not at all  · You are vomiting  · You have a high fever with shaking chills  · You have side or back pain that gets worse  Contact your healthcare provider if:   · You have a fever  · You are a woman and you have increased white or yellow discharge from your vagina  · You do not feel better after 2 days of taking antibiotics  · You have questions or concerns about your condition or care  Medicines:   · Medicines  help treat the bacterial infection or decrease pain and burning when you urinate  You may also need medicines to decrease the urge to urinate often  Your healthcare provider may recommend cranberry juice or cranberry supplements to help decrease your symptoms  · Take your medicine as directed  Contact your healthcare provider if you think your medicine is not helping or if you have side effects  Tell him or her if you are allergic to any medicine  Keep a list of the medicines, vitamins, and herbs you take  Include the amounts, and when and why you take them  Bring the list or the pill bottles to follow-up visits  Carry your medicine list with you in case of an emergency  Self-care:   · Urinate when you feel the urge  Do not hold your urine because bacteria can grow in the bladder if urine stays in the bladder too long   It may be helpful to urinate at least every 3 to 4 hours  · Drink liquids as directed  Liquids can help flush bacteria from your urinary tract  Ask how much liquid to drink each day and which liquids are best for you  You may need to drink more liquids than usual to help flush out the bacteria  Do not drink alcohol, caffeine, and citrus juices  These can irritate your bladder and increase your symptoms  · Apply heat  on your abdomen for 20 to 30 minutes every 2 hours for as many days as directed  Heat helps decrease discomfort and pressure in your bladder  Prevent a UTI:   · Women should wipe front to back  after urinating or having a bowel movement  This may prevent germs from getting into the urinary tract  · Urinate after you have sex  to flush away bacteria that can enter your urinary tract during sex  · Wear cotton underwear and clothes that fit loose  Tight pants and nylon underwear can trap moisture and cause bacteria to grow  Follow up with your healthcare provider as directed:  Write down your questions so you remember to ask them during your visits  © 2017 [facility] Information is for End User's use only and may not be sold, redistributed or otherwise used for commercial purposes  All illustrations and images included in CareNotes® are the copyrighted property of [employer]  or Jarred Fuentes  The above information is an  only  It is not intended as medical advice for individual conditions or treatments  Talk to your doctor, nurse or pharmacist before following any medical regimen to see if it is safe and effective for you

## 2020-10-01 LAB
BACTERIA UR CULT: ABNORMAL
BACTERIA UR CULT: ABNORMAL

## 2020-12-16 DIAGNOSIS — F32.A DEPRESSION, UNSPECIFIED DEPRESSION TYPE: ICD-10-CM

## 2020-12-16 RX ORDER — BUPROPION HYDROCHLORIDE 300 MG/1
300 TABLET ORAL EVERY MORNING
Qty: 90 TABLET | Refills: 3 | Status: SHIPPED | OUTPATIENT
Start: 2020-12-16

## 2021-01-04 ENCOUNTER — TELEMEDICINE (OUTPATIENT)
Dept: FAMILY MEDICINE CLINIC | Facility: CLINIC | Age: 56
End: 2021-01-04
Payer: COMMERCIAL

## 2021-01-04 DIAGNOSIS — R50.9 FEVER, UNSPECIFIED FEVER CAUSE: ICD-10-CM

## 2021-01-04 DIAGNOSIS — R50.9 FEVER, UNSPECIFIED FEVER CAUSE: Primary | ICD-10-CM

## 2021-01-04 PROCEDURE — U0003 INFECTIOUS AGENT DETECTION BY NUCLEIC ACID (DNA OR RNA); SEVERE ACUTE RESPIRATORY SYNDROME CORONAVIRUS 2 (SARS-COV-2) (CORONAVIRUS DISEASE [COVID-19]), AMPLIFIED PROBE TECHNIQUE, MAKING USE OF HIGH THROUGHPUT TECHNOLOGIES AS DESCRIBED BY CMS-2020-01-R: HCPCS | Performed by: FAMILY MEDICINE

## 2021-01-04 PROCEDURE — 99214 OFFICE O/P EST MOD 30 MIN: CPT | Performed by: FAMILY MEDICINE

## 2021-01-04 NOTE — PROGRESS NOTES
COVID-19 Virtual Visit     Assessment/Plan:    Problem List Items Addressed This Visit     None      Visit Diagnoses     Fever, unspecified fever cause    -  Primary         Disposition:     I recommended self-quarantine for 14 days and to call back for worsening symptoms or development of shortness of breath  I referred patient to one of our centralized sites for a COVID-19 swab  I have spent 5 minutes directly with the patient  Encounter provider Allegra Wayne MD    Provider located at [address] [place] [phone]    Recent Visits  No visits were found meeting these conditions  Showing recent visits within past 7 days and meeting all other requirements     Today's Visits  Date Type Provider Dept   01/04/21 Telemedicine Allegra Wayne  [facility] today's visits and meeting all other requirements     Future Appointments  No visits were found meeting these conditions  Showing future appointments within next 150 days and meeting all other requirements        Patient agrees to participate in a virtual check in via telephone or video visit instead of presenting to the office to address urgent/immediate medical needs  Patient is aware this is a billable service  After connecting through Telephone, the patient was identified by name and date of birth  Daniel Snow was informed that this was a telemedicine visit and that the exam was being conducted confidentially over secure lines  My office door was closed  No one else was in the room  Daniel Snow acknowledged consent and understanding of privacy and security of the telemedicine visit  I informed the patient that I have reviewed her record in Epic and presented the opportunity for her to ask any questions regarding the visit today  The patient agreed to participate      It was my intent to perform this visit via video technology but the patient was not able to do a video connection so the visit was completed via audio telephone only  Subjective:   Alejandro Murrell is a 60 y o  female who is concerned about COVID-19  Date of symptom onset: 1/4/2021    Patient's symptoms include fever (100 1F) and myalgias  Patient denies chills, fatigue, malaise, congestion, rhinorrhea, sore throat, anosmia, loss of taste, cough, shortness of breath, chest tightness, abdominal pain, nausea, vomiting, diarrhea and headaches  Exposure:   Contact with a person who is under investigation (PUI) for or who is positive for COVID-19 within the last 14 days?: No    Hospitalized recently for fever and/or lower respiratory symptoms?: No      Currently a healthcare worker that is involved in direct patient care?: Yes      Works in a special setting where the risk of COVID-19 transmission may be high? (this may include long-term care, correctional and alf facilities; homeless shelters; assisted-living facilities and group homes ): Yes      Resident in a special setting where the risk of COVID-19 transmission may be high? (this may include long-term care, correctional and alf facilities; homeless shelters; assisted-living facilities and group homes ): No      Lab Results   Component Value Date    Sanz Silence Not Detected 04/29/2020     Past Medical History:   Diagnosis Date    Depression     Hyperlipidemia     Hypertension     Orthostatic hypotension 2011    Other specified anxiety disorders     PONV (postoperative nausea and vomiting)     Syncope     last episode 8/2018     Past Surgical History:   Procedure Laterality Date    CATARACT EXTRACTION Bilateral     COLONOSCOPY      DILATION AND EVACUATION      ENDOMETRIAL BIOPSY  03/2017    NE COLONOSCOPY FLX DX W/COLLJ SPEC WHEN PFRMD N/A 11/2/2018    Procedure: COLONOSCOPY;  Surgeon: Radha Toure MD;  Location: [place] [address] [facility];   Service: Gastroenterology     Current Outpatient Medications   Medication Sig Dispense Refill    buPROPion (WELLBUTRIN XL) 300 mg 24 hr tablet Take 1 tablet (300 mg total) by mouth every morning 90 tablet 3    fexofenadine (ALLEGRA) 30 MG tablet Take 30 mg by mouth as needed        metoprolol succinate (TOPROL-XL) 25 mg 24 hr tablet Take 1 tablet (25 mg total) by mouth daily at bedtime 90 tablet 3    phenazopyridine (PYRIDIUM) 200 mg tablet Take 1 tablet (200 mg total) by mouth 3 (three) times a day with meals 10 tablet 0     No current facility-administered medications for this visit  Allergies   Allergen Reactions    Prednisone Anxiety     Reaction Date: [id number];        Review of Systems   Constitutional: Positive for fever (100 1F)  Negative for chills and fatigue  HENT: Negative for congestion, rhinorrhea and sore throat  Respiratory: Negative for cough, chest tightness and shortness of breath  Gastrointestinal: Negative for abdominal pain, diarrhea, nausea and vomiting  Musculoskeletal: Positive for myalgias  Neurological: Negative for headaches  Psychiatric/Behavioral: The patient is nervous/anxious  Objective: There were no vitals filed for this visit  Physical Exam  VIRTUAL VISIT DISCLAIMER    Sandra Quesada acknowledges that she has consented to an online visit or consultation  She understands that the online visit is based solely on information provided by her, and that, in the absence of a face-to-face physical evaluation by the physician, the diagnosis she receives is both limited and provisional in terms of accuracy and completeness  This is not intended to replace a full medical face-to-face evaluation by the physician  Sandra Quesada understands and accepts these terms

## 2021-01-05 ENCOUNTER — TELEPHONE (OUTPATIENT)
Dept: FAMILY MEDICINE CLINIC | Facility: CLINIC | Age: 56
End: 2021-01-05

## 2021-01-05 LAB — SARS-COV-2 RNA SPEC QL NAA+PROBE: NOT DETECTED

## 2021-01-05 NOTE — TELEPHONE ENCOUNTER
----- Message from Rhonda Barcenas MD sent at 1/5/2021  2:06 PM EST -----  Pl, advise pt -  Negative COVID test

## 2021-04-22 ENCOUNTER — OFFICE VISIT (OUTPATIENT)
Dept: FAMILY MEDICINE CLINIC | Facility: CLINIC | Age: 56
End: 2021-04-22
Payer: COMMERCIAL

## 2021-04-22 VITALS
TEMPERATURE: 97.9 F | SYSTOLIC BLOOD PRESSURE: 130 MMHG | HEIGHT: 65 IN | WEIGHT: 183.6 LBS | HEART RATE: 86 BPM | OXYGEN SATURATION: 98 % | BODY MASS INDEX: 30.59 KG/M2 | RESPIRATION RATE: 14 BRPM | DIASTOLIC BLOOD PRESSURE: 80 MMHG

## 2021-04-22 DIAGNOSIS — J30.2 SEASONAL ALLERGIES: Primary | ICD-10-CM

## 2021-04-22 PROCEDURE — 99213 OFFICE O/P EST LOW 20 MIN: CPT | Performed by: FAMILY MEDICINE

## 2021-04-22 PROCEDURE — 3008F BODY MASS INDEX DOCD: CPT | Performed by: FAMILY MEDICINE

## 2021-04-22 PROCEDURE — 1036F TOBACCO NON-USER: CPT | Performed by: FAMILY MEDICINE

## 2021-04-22 RX ORDER — MONTELUKAST SODIUM 10 MG/1
10 TABLET ORAL
Qty: 30 TABLET | Refills: 0 | Status: SHIPPED | OUTPATIENT
Start: 2021-04-22 | End: 2021-05-17

## 2021-04-22 NOTE — PROGRESS NOTES
Assessment/Plan:    1  Seasonal allergies  -     montelukast (SINGULAIR) 10 mg tablet; Take 1 tablet (10 mg total) by mouth daily at bedtime    Advised to use flonase + allegra or claritin  Use Singulair at night  Side effects and precautions reviewed in detail  Return if symptoms worsen or fail to improve, for Annual physical    Needs to schedule physical      Subjective:      Patient ID: Ravi Lynn is a 60 y o  female  Chief Complaint   Patient presents with    Allergies       HPI  59 y/o female comes with concerns with sneezing, watery/itchy eyes, and rhinorrhea for the past 3 weeks  Ears feel itchy  Patient states she tried allegra, claritin, zytrec and the D's with no relief  Denies any headache, ear drainage, chest pain or shortness of breath  Patient has a history of allergies and usually worse during this time frame  The following portions of the patient's history were reviewed and updated as appropriate: allergies, current medications, past family history, past medical history, past social history, past surgical history and problem list     Review of Systems   Constitutional: Negative for appetite change and fever  HENT: Positive for ear pain, rhinorrhea and sneezing  Negative for sore throat  Eyes: Positive for itching  Negative for redness  Respiratory: Negative for cough and shortness of breath  Cardiovascular: Negative for chest pain and leg swelling  Gastrointestinal: Negative for abdominal pain, diarrhea, nausea and vomiting  Musculoskeletal: Negative for arthralgias  Skin: Negative for color change  Neurological: Negative for dizziness, light-headedness and headaches  Psychiatric/Behavioral: Negative for agitation and behavioral problems           Current Outpatient Medications   Medication Sig Dispense Refill    buPROPion (WELLBUTRIN XL) 300 mg 24 hr tablet Take 1 tablet (300 mg total) by mouth every morning 90 tablet 3    metoprolol succinate (TOPROL-XL) 25 mg 24 hr tablet Take 1 tablet (25 mg total) by mouth daily at bedtime 90 tablet 3    fexofenadine (ALLEGRA) 30 MG tablet Take 30 mg by mouth as needed        montelukast (SINGULAIR) 10 mg tablet Take 1 tablet (10 mg total) by mouth daily at bedtime 30 tablet 0    phenazopyridine (PYRIDIUM) 200 mg tablet Take 1 tablet (200 mg total) by mouth 3 (three) times a day with meals (Patient not taking: Reported on 4/22/2021) 10 tablet 0     No current facility-administered medications for this visit  Objective:    /80 (BP Location: Left arm, Patient Position: Sitting, Cuff Size: Standard)   Pulse 86   Temp 97 9 °F (36 6 °C) (Temporal)   Resp 14   Ht 5' 5" (1 651 m)   Wt 83 3 kg (183 lb 9 6 oz)   SpO2 98%   BMI 30 55 kg/m²        Physical Exam  Constitutional:       General: She is not in acute distress  Appearance: She is well-developed  HENT:      Head: Normocephalic and atraumatic  Right Ear: External ear normal       Left Ear: External ear normal       Nose: Nose normal       Mouth/Throat:      Pharynx: Oropharynx is clear  No oropharyngeal exudate  Eyes:      General:         Right eye: No discharge  Left eye: No discharge  Neck:      Musculoskeletal: Normal range of motion and neck supple  Cardiovascular:      Rate and Rhythm: Normal rate and regular rhythm  Heart sounds: Normal heart sounds  No murmur  Pulmonary:      Effort: Pulmonary effort is normal  No respiratory distress  Breath sounds: Normal breath sounds  Skin:     General: Skin is warm  Neurological:      Mental Status: She is alert and oriented to person, place, and time     Psychiatric:         Behavior: Behavior normal                 Marley Pitt MD

## 2021-04-22 NOTE — LETTER
April 22, 2021     Patient: Tri Stover   YOB: 1965   Date of Visit: 4/22/2021       To Whom it May Concern:    Tri Stover is under my professional care  She was seen in my office on 4/22/2021  She may return to work on 4/23/21  If you have any questions or concerns, please don't hesitate to call           Sincerely,          Kristin Key MD        CC: No Recipients

## 2021-05-08 ENCOUNTER — OFFICE VISIT (OUTPATIENT)
Dept: FAMILY MEDICINE CLINIC | Facility: CLINIC | Age: 56
End: 2021-05-08
Payer: COMMERCIAL

## 2021-05-08 VITALS
TEMPERATURE: 97.7 F | RESPIRATION RATE: 18 BRPM | SYSTOLIC BLOOD PRESSURE: 124 MMHG | WEIGHT: 187.2 LBS | DIASTOLIC BLOOD PRESSURE: 78 MMHG | OXYGEN SATURATION: 99 % | HEIGHT: 65 IN | BODY MASS INDEX: 31.19 KG/M2 | HEART RATE: 70 BPM

## 2021-05-08 DIAGNOSIS — Z12.31 ENCOUNTER FOR SCREENING MAMMOGRAM FOR BREAST CANCER: ICD-10-CM

## 2021-05-08 DIAGNOSIS — N95.1 MENOPAUSAL HOT FLUSHES: Primary | ICD-10-CM

## 2021-05-08 DIAGNOSIS — F32.4 MAJOR DEPRESSIVE DISORDER WITH SINGLE EPISODE, IN PARTIAL REMISSION (HCC): ICD-10-CM

## 2021-05-08 PROCEDURE — 1036F TOBACCO NON-USER: CPT | Performed by: FAMILY MEDICINE

## 2021-05-08 PROCEDURE — 3008F BODY MASS INDEX DOCD: CPT | Performed by: FAMILY MEDICINE

## 2021-05-08 PROCEDURE — 3725F SCREEN DEPRESSION PERFORMED: CPT | Performed by: FAMILY MEDICINE

## 2021-05-08 PROCEDURE — 99214 OFFICE O/P EST MOD 30 MIN: CPT | Performed by: FAMILY MEDICINE

## 2021-05-08 RX ORDER — FLUOXETINE 10 MG/1
10 TABLET, FILM COATED ORAL DAILY
Qty: 30 TABLET | Refills: 5 | Status: SHIPPED | OUTPATIENT
Start: 2021-05-08 | End: 2021-09-07

## 2021-05-08 NOTE — PROGRESS NOTES
Chief Complaint   Patient presents with    Menopause     patient here to discuss menopause        Patient ID: Mykel Lyer is a 60 y o  female  HPI  Pt is seeing for worsening hot flushes at night for 1 wk, cannot sleep -  No OTC therapy tried, on Wellbutrin for depression -  Was feeling better on brand name  -  Currently taking generic  -  No SI     The following portions of the patient's history were reviewed and updated as appropriate: allergies, current medications, past family history, past medical history, past social history, past surgical history and problem list     Review of Systems   Constitutional: Negative  Respiratory: Negative  Cardiovascular: Negative  Gastrointestinal: Negative  Endocrine: Negative for cold intolerance, heat intolerance, polydipsia, polyphagia and polyuria  Genitourinary: Negative  Musculoskeletal: Negative  Skin: Negative  Neurological: Negative  Psychiatric/Behavioral: Positive for decreased concentration, dysphoric mood and sleep disturbance  Negative for agitation, behavioral problems and suicidal ideas  The patient is not nervous/anxious  Current Outpatient Medications   Medication Sig Dispense Refill    buPROPion (WELLBUTRIN XL) 300 mg 24 hr tablet Take 1 tablet (300 mg total) by mouth every morning 90 tablet 3    fexofenadine (ALLEGRA) 30 MG tablet Take 30 mg by mouth as needed        metoprolol succinate (TOPROL-XL) 25 mg 24 hr tablet Take 1 tablet (25 mg total) by mouth daily at bedtime 90 tablet 3    montelukast (SINGULAIR) 10 mg tablet Take 1 tablet (10 mg total) by mouth daily at bedtime 30 tablet 0            No current facility-administered medications for this visit          Objective:    /78 (BP Location: Left arm, Patient Position: Sitting, Cuff Size: Standard)   Pulse 70   Temp 97 7 °F (36 5 °C) (Temporal)   Resp 18   Ht 5' 5" (1 651 m)   Wt 84 9 kg (187 lb 3 2 oz)   SpO2 99%   BMI 31 15 kg/m² Physical Exam  Constitutional:       Appearance: She is not ill-appearing  Cardiovascular:      Rate and Rhythm: Normal rate  Pulmonary:      Effort: Pulmonary effort is normal  No respiratory distress  Neurological:      General: No focal deficit present  Mental Status: She is alert and oriented to person, place, and time  Psychiatric:         Mood and Affect: Mood normal          Thought Content: Thought content normal          Judgment: Judgment normal            Labs in chart were reviewed  Assessment/Plan:         Diagnoses and all orders for this visit:    Menopausal hot flushes  -     Ambulatory referral to Gynecology; Future  -     FLUoxetine (PROzac) 10 MG tablet; Take 1 tablet (10 mg total) by mouth daily    Encounter for screening mammogram for breast cancer  -     Mammo screening bilateral w cad; Future    Major depressive disorder with single episode, in partial remission (HCC)       adding Fluoxetine will help depression as well       BMI Counseling: Body mass index is 31 15 kg/m²  Discussed the patient's BMI with her  The BMI is above normal  Nutrition recommendations include reducing portion sizes, decreasing overall calorie intake, 3-5 servings of fruits/vegetables daily and reducing fast food intake  Exercise recommendations include exercising 3-5 times per week           rto in 2-3 wks for Yesenia Solano MD

## 2021-05-14 ENCOUNTER — TELEPHONE (OUTPATIENT)
Dept: FAMILY MEDICINE CLINIC | Facility: CLINIC | Age: 56
End: 2021-05-14

## 2021-05-14 NOTE — TELEPHONE ENCOUNTER
Pt states that prozac is making her angry and agitated  Is it possible that she can be prescribed lexapro instead?

## 2021-05-14 NOTE — TELEPHONE ENCOUNTER
Pl, advise pt-   Ok tos top Fluoxetine -  Lexapro does not help with hot flushes -  Pt needs to see GYN

## 2021-05-15 DIAGNOSIS — J30.2 SEASONAL ALLERGIES: ICD-10-CM

## 2021-05-17 RX ORDER — MONTELUKAST SODIUM 10 MG/1
TABLET ORAL
Qty: 30 TABLET | Refills: 6 | Status: SHIPPED | OUTPATIENT
Start: 2021-05-17 | End: 2021-09-07 | Stop reason: SDUPTHER

## 2021-08-24 ENCOUNTER — OFFICE VISIT (OUTPATIENT)
Dept: URGENT CARE | Facility: CLINIC | Age: 56
End: 2021-08-24
Payer: COMMERCIAL

## 2021-08-24 VITALS
DIASTOLIC BLOOD PRESSURE: 81 MMHG | TEMPERATURE: 97 F | SYSTOLIC BLOOD PRESSURE: 143 MMHG | HEART RATE: 62 BPM | OXYGEN SATURATION: 98 %

## 2021-08-24 DIAGNOSIS — N30.00 ACUTE CYSTITIS WITHOUT HEMATURIA: Primary | ICD-10-CM

## 2021-08-24 PROCEDURE — 99213 OFFICE O/P EST LOW 20 MIN: CPT | Performed by: PHYSICIAN ASSISTANT

## 2021-08-24 PROCEDURE — 87086 URINE CULTURE/COLONY COUNT: CPT | Performed by: PHYSICIAN ASSISTANT

## 2021-08-24 RX ORDER — DULOXETIN HYDROCHLORIDE 30 MG/1
30 CAPSULE, DELAYED RELEASE ORAL DAILY
COMMUNITY
End: 2022-07-08

## 2021-08-24 RX ORDER — SULFAMETHOXAZOLE AND TRIMETHOPRIM 800; 160 MG/1; MG/1
1 TABLET ORAL EVERY 12 HOURS SCHEDULED
Qty: 10 TABLET | Refills: 0 | Status: SHIPPED | OUTPATIENT
Start: 2021-08-24 | End: 2021-08-29

## 2021-08-24 RX ORDER — PHENAZOPYRIDINE HYDROCHLORIDE 200 MG/1
200 TABLET, FILM COATED ORAL
Qty: 9 TABLET | Refills: 0 | Status: SHIPPED | OUTPATIENT
Start: 2021-08-24 | End: 2021-08-27

## 2021-08-24 NOTE — PATIENT INSTRUCTIONS
Urine today showed signs of infection  Will prescribe an antibiotic  Finish the entire dose of antibiotics even if feeling better  Can take over the counter UTI symptom relief such as AZO if you find it helpful  Increase the amount of fluids you are drinking over the next week  If you experience increased abdominal or back pain, nausea, vomiting or fever please go to the ER  Follow up with primary care doctor if needed for frequent symptoms

## 2021-08-24 NOTE — PROGRESS NOTES
[facility]        NAME: Sarah Fay is a 60 y o  female  : 1965    MRN: [id number]  DATE: 2021  TIME: 4:34 PM    Assessment and Plan   Acute cystitis without hematuria [N30 00]  1  Acute cystitis without hematuria  sulfamethoxazole-trimethoprim (BACTRIM DS) 800-160 mg per tablet    phenazopyridine (PYRIDIUM) 200 mg tablet         Patient Instructions     Patient Instructions    Urine today showed signs of infection  Will prescribe an antibiotic  Finish the entire dose of antibiotics even if feeling better  Can take over the counter UTI symptom relief such as AZO if you find it helpful  Increase the amount of fluids you are drinking over the next week  If you experience increased abdominal or back pain, nausea, vomiting or fever please go to the ER  Follow up with primary care doctor if needed for frequent symptoms  Follow up with PCP in 3-5 days  Proceed to  ER if symptoms worsen  Chief Complaint     Chief Complaint   Patient presents with    Possible UTI     cloudy urine, pain in abdomen, dysuria 5 days         History of Present Illness       47-year-old female presents with lower abdominal pain and pressure, burning, frequency and urgency with urination x5 days  Patient also admits to some low-grade fevers below 100  Over the last 2 days  She notes there is a foul smell in her urine  And overall just not feeling 100%  She denies any nausea, vomiting or back pain  No visible blood  Patient is only taking Tylenol for her symptoms  Review of Systems   Review of Systems   Constitutional: Negative for fever  Respiratory: Negative for shortness of breath  Cardiovascular: Negative for chest pain  Gastrointestinal: Positive for abdominal pain  Negative for nausea and vomiting  Genitourinary: Positive for dysuria, frequency and urgency  Negative for flank pain and hematuria           Current Medications       Current Outpatient Medications:     buPROPion (WELLBUTRIN XL) 300 mg 24 hr tablet, Take 1 tablet (300 mg total) by mouth every morning, Disp: 90 tablet, Rfl: 3    DULoxetine (CYMBALTA) 30 mg delayed release capsule, Take 30 mg by mouth daily, Disp: , Rfl:     fexofenadine (ALLEGRA) 30 MG tablet, Take 30 mg by mouth as needed  , Disp: , Rfl:     metoprolol succinate (TOPROL-XL) 25 mg 24 hr tablet, Take 1 tablet (25 mg total) by mouth daily at bedtime, Disp: 90 tablet, Rfl: 3    montelukast (SINGULAIR) 10 mg tablet, TAKE 1 TABLET BY MOUTH DAILY AT BEDTIME, Disp: 30 tablet, Rfl: 6    FLUoxetine (PROzac) 10 MG tablet, Take 1 tablet (10 mg total) by mouth daily (Patient not taking: Reported on 8/24/2021), Disp: 30 tablet, Rfl: 5    phenazopyridine (PYRIDIUM) 200 mg tablet, Take 1 tablet (200 mg total) by mouth 3 (three) times a day with meals for 3 days, Disp: 9 tablet, Rfl: 0    sulfamethoxazole-trimethoprim (BACTRIM DS) 800-160 mg per tablet, Take 1 tablet by mouth every 12 (twelve) hours for 5 days, Disp: 10 tablet, Rfl: 0    Current Allergies     Allergies as of 08/24/2021 - Reviewed 08/24/2021   Allergen Reaction Noted    Prednisone Anxiety 02/14/2012            The following portions of the patient's history were reviewed and updated as appropriate: allergies, current medications, past family history, past medical history, past social history, past surgical history and problem list      Past Medical History:   Diagnosis Date    Depression     Hyperlipidemia     Hypertension     Orthostatic hypotension 2011    Other specified anxiety disorders     PONV (postoperative nausea and vomiting)     Syncope     last episode 8/2018       Past Surgical History:   Procedure Laterality Date    CATARACT EXTRACTION Bilateral     COLONOSCOPY      DILATION AND EVACUATION      ENDOMETRIAL BIOPSY  03/2017    TX COLONOSCOPY FLX DX W/COLLJ SPEC WHEN PFRMD N/A 11/2/2018    Procedure: COLONOSCOPY;  Surgeon: Rony Tavarez MD;  Location: [facility] [address] [facility];   Service: Gastroenterology       Family History   Problem Relation Age of Onset    Diabetes Mother     Hypertension Mother     Hyperlipidemia Mother     Hypertension Father     Hyperlipidemia Sister     Hypertension Sister     Diabetes Maternal Grandmother     No Known Problems Brother     No Known Problems Maternal Aunt     No Known Problems Maternal Uncle     No Known Problems Paternal Aunt     No Known Problems Paternal Uncle     No Known Problems Maternal Grandfather     No Known Problems Paternal Grandmother     No Known Problems Paternal Grandfather     ADD / ADHD Neg Hx     Anesthesia problems Neg Hx     Cancer Neg Hx     Clotting disorder Neg Hx     Collagen disease Neg Hx     Dislocations Neg Hx     Learning disabilities Neg Hx     Neurological problems Neg Hx     Osteoporosis Neg Hx     Rheumatologic disease Neg Hx     Scoliosis Neg Hx     Vascular Disease Neg Hx          Medications have been verified  Objective   /81   Pulse 62   Temp (!) 97 °F (36 1 °C)   SpO2 98%        Physical Exam     Physical Exam  Vitals and nursing note reviewed  Constitutional:       Appearance: Normal appearance  HENT:      Head: Normocephalic and atraumatic  Cardiovascular:      Rate and Rhythm: Normal rate and regular rhythm  Pulses: Normal pulses  Heart sounds: Normal heart sounds  No murmur heard  Pulmonary:      Effort: Pulmonary effort is normal  No respiratory distress  Breath sounds: Normal breath sounds  Abdominal:      General: Abdomen is flat  Bowel sounds are normal  There is no distension  Palpations: Abdomen is soft  Tenderness: There is abdominal tenderness (Mild suprapubic)  There is no right CVA tenderness or left CVA tenderness  Skin:     General: Skin is warm  Neurological:      Mental Status: She is alert and oriented to person, place, and time     Psychiatric:         Behavior: Behavior normal

## 2021-08-26 LAB
BACTERIA UR CULT: ABNORMAL
BACTERIA UR CULT: ABNORMAL

## 2021-09-07 ENCOUNTER — OFFICE VISIT (OUTPATIENT)
Dept: FAMILY MEDICINE CLINIC | Facility: CLINIC | Age: 56
End: 2021-09-07
Payer: COMMERCIAL

## 2021-09-07 VITALS
TEMPERATURE: 97 F | WEIGHT: 190.6 LBS | HEIGHT: 65 IN | SYSTOLIC BLOOD PRESSURE: 130 MMHG | HEART RATE: 70 BPM | OXYGEN SATURATION: 98 % | BODY MASS INDEX: 31.75 KG/M2 | RESPIRATION RATE: 16 BRPM | DIASTOLIC BLOOD PRESSURE: 80 MMHG

## 2021-09-07 DIAGNOSIS — J30.2 SEASONAL ALLERGIES: ICD-10-CM

## 2021-09-07 DIAGNOSIS — I10 BENIGN HYPERTENSION: Primary | ICD-10-CM

## 2021-09-07 DIAGNOSIS — E55.9 VITAMIN D DEFICIENCY: ICD-10-CM

## 2021-09-07 PROCEDURE — 99214 OFFICE O/P EST MOD 30 MIN: CPT | Performed by: FAMILY MEDICINE

## 2021-09-07 RX ORDER — MONTELUKAST SODIUM 10 MG/1
10 TABLET ORAL
Qty: 30 TABLET | Refills: 6 | Status: SHIPPED | OUTPATIENT
Start: 2021-09-07

## 2021-09-07 RX ORDER — OLOPATADINE HYDROCHLORIDE 1 MG/ML
1 SOLUTION/ DROPS OPHTHALMIC 2 TIMES DAILY
Qty: 5 ML | Refills: 6 | Status: SHIPPED | OUTPATIENT
Start: 2021-09-07

## 2021-09-07 NOTE — LETTER
September 7, 2021     Patient: Gerardo Nunez   YOB: 1965   Date of Visit: 9/7/2021       To Whom it May Concern:    Gerardo Nunez is under my professional care  She was seen in my office on 9/7/2021  She may return to work on 9/8/2021  If you have any questions or concerns, please don't hesitate to call           Sincerely,          Yudi Almaraz MD        CC: No Recipients

## 2021-09-07 NOTE — PROGRESS NOTES
Chief Complaint   Patient presents with    craving salt    Hypertension    Allergic Rhinitis        Patient ID: To Harris is a 60 y o  female  HPI  Pt is seeing for f/u HTN - eating more salty food recently -  Has seasonal allergies - stable, h/o low Vit D  - on VIT D  5000 IU a day OTC     The following portions of the patient's history were reviewed and updated as appropriate: allergies, current medications, past family history, past medical history, past social history, past surgical history and problem list     Review of Systems   Constitutional: Negative  Respiratory: Negative  Cardiovascular: Negative  Gastrointestinal: Negative  Genitourinary: Negative  Musculoskeletal: Negative  Skin: Negative  Neurological: Negative  Psychiatric/Behavioral:        Under psych care for Depression -  Dr Forde        Current Outpatient Medications   Medication Sig Dispense Refill    buPROPion (WELLBUTRIN XL) 300 mg 24 hr tablet Take 1 tablet (300 mg total) by mouth every morning 90 tablet 3    DULoxetine (CYMBALTA) 30 mg delayed release capsule Take 30 mg by mouth daily      fexofenadine (ALLEGRA) 30 MG tablet Take 30 mg by mouth as needed        metoprolol succinate (TOPROL-XL) 25 mg 24 hr tablet Take 1 tablet (25 mg total) by mouth daily at bedtime 90 tablet 3    montelukast (SINGULAIR) 10 mg tablet TAKE 1 TABLET BY MOUTH DAILY AT BEDTIME 30 tablet 6    FLUoxetine (PROzac) 10 MG tablet Take 1 tablet (10 mg total) by mouth daily (Patient not taking: Reported on 8/24/2021) 30 tablet 5     No current facility-administered medications for this visit  Objective:    /80 (BP Location: Left arm, Patient Position: Sitting, Cuff Size: Standard)   Pulse 70   Temp (!) 97 °F (36 1 °C) (Temporal)   Resp 16   Ht 5' 5" (1 651 m)   Wt 86 5 kg (190 lb 9 6 oz)   SpO2 98%   BMI 31 72 kg/m²        Physical Exam  Constitutional:       Appearance: She is obese   She is not ill-appearing  Cardiovascular:      Rate and Rhythm: Normal rate and regular rhythm  Heart sounds: No murmur heard  Pulmonary:      Effort: Pulmonary effort is normal  No respiratory distress  Breath sounds: No wheezing, rhonchi or rales  Musculoskeletal:      Right lower leg: No edema  Left lower leg: No edema  Neurological:      General: No focal deficit present  Mental Status: She is alert and oriented to person, place, and time  Psychiatric:         Mood and Affect: Mood normal          Thought Content: Thought content normal            Labs in chart were reviewed  Assessment/Plan:         Diagnoses and all orders for this visit:    Benign hypertension  -     Comprehensive metabolic panel; Future  -     TSH, 3rd generation; Future  -     Lipid panel; Future    Seasonal allergies  -     montelukast (SINGULAIR) 10 mg tablet; Take 1 tablet (10 mg total) by mouth daily at bedtime  -     olopatadine (PATANOL) 0 1 % ophthalmic solution; Administer 1 drop to both eyes 2 (two) times a day    Vitamin D deficiency  -     Vitamin D 25 hydroxy; Future            BMI Counseling: Body mass index is 31 72 kg/m²  Discussed the patient's BMI with her  The BMI is above normal  Nutrition recommendations include reducing portion sizes, decreasing overall calorie intake, 3-5 servings of fruits/vegetables daily, reducing fast food intake and consuming healthier snacks  Exercise recommendations include exercising 3-5 times per week         rto in 6 m         Louis Sifuentes MD

## 2021-09-08 ENCOUNTER — TELEPHONE (OUTPATIENT)
Dept: FAMILY MEDICINE CLINIC | Facility: CLINIC | Age: 56
End: 2021-09-08

## 2021-09-08 LAB
25(OH)D3+25(OH)D2 SERPL-MCNC: 31.7 NG/ML (ref 30–100)
ALBUMIN SERPL-MCNC: 4.5 G/DL (ref 3.8–4.9)
ALBUMIN/GLOB SERPL: 1.8 {RATIO} (ref 1.2–2.2)
ALP SERPL-CCNC: 102 IU/L (ref 48–121)
ALT SERPL-CCNC: 16 IU/L (ref 0–32)
AST SERPL-CCNC: 19 IU/L (ref 0–40)
BILIRUB SERPL-MCNC: 0.2 MG/DL (ref 0–1.2)
BUN SERPL-MCNC: 17 MG/DL (ref 6–24)
BUN/CREAT SERPL: 17 (ref 9–23)
CALCIUM SERPL-MCNC: 9.9 MG/DL (ref 8.7–10.2)
CHLORIDE SERPL-SCNC: 104 MMOL/L (ref 96–106)
CHOLEST SERPL-MCNC: 282 MG/DL (ref 100–199)
CO2 SERPL-SCNC: 23 MMOL/L (ref 20–29)
CREAT SERPL-MCNC: 0.98 MG/DL (ref 0.57–1)
GLOBULIN SER-MCNC: 2.5 G/DL (ref 1.5–4.5)
GLUCOSE SERPL-MCNC: 88 MG/DL (ref 65–99)
HDLC SERPL-MCNC: 43 MG/DL
LDLC SERPL CALC-MCNC: 188 MG/DL (ref 0–99)
MICRODELETION SYND BLD/T FISH: NORMAL
POTASSIUM SERPL-SCNC: 4.5 MMOL/L (ref 3.5–5.2)
PROT SERPL-MCNC: 7 G/DL (ref 6–8.5)
SL AMB EGFR AFRICAN AMERICAN: 74 ML/MIN/1.73
SL AMB EGFR NON AFRICAN AMERICAN: 64 ML/MIN/1.73
SL AMB VLDL CHOLESTEROL CALC: 51 MG/DL (ref 5–40)
SODIUM SERPL-SCNC: 141 MMOL/L (ref 134–144)
TRIGL SERPL-MCNC: 264 MG/DL (ref 0–149)
TSH SERPL DL<=0.005 MIU/L-ACNC: 1.28 UIU/ML (ref 0.45–4.5)

## 2021-09-16 ENCOUNTER — VBI (OUTPATIENT)
Dept: ADMINISTRATIVE | Facility: OTHER | Age: 56
End: 2021-09-16

## 2021-09-22 ENCOUNTER — OFFICE VISIT (OUTPATIENT)
Dept: OTOLARYNGOLOGY | Facility: CLINIC | Age: 56
End: 2021-09-22
Payer: COMMERCIAL

## 2021-09-22 ENCOUNTER — OFFICE VISIT (OUTPATIENT)
Dept: AUDIOLOGY | Facility: CLINIC | Age: 56
End: 2021-09-22
Payer: COMMERCIAL

## 2021-09-22 VITALS — TEMPERATURE: 97.8 F | BODY MASS INDEX: 31.65 KG/M2 | WEIGHT: 190 LBS | HEIGHT: 65 IN

## 2021-09-22 DIAGNOSIS — H90.3 SENSORINEURAL HEARING LOSS (SNHL), BILATERAL: Primary | ICD-10-CM

## 2021-09-22 DIAGNOSIS — H90.5 SENSORY HEARING LOSS, UNILATERAL: Primary | ICD-10-CM

## 2021-09-22 PROCEDURE — 92557 COMPREHENSIVE HEARING TEST: CPT | Performed by: AUDIOLOGIST

## 2021-09-22 PROCEDURE — 92567 TYMPANOMETRY: CPT | Performed by: AUDIOLOGIST

## 2021-09-22 PROCEDURE — 3008F BODY MASS INDEX DOCD: CPT | Performed by: NURSE PRACTITIONER

## 2021-09-22 PROCEDURE — 1036F TOBACCO NON-USER: CPT | Performed by: NURSE PRACTITIONER

## 2021-09-22 PROCEDURE — 99243 OFF/OP CNSLTJ NEW/EST LOW 30: CPT | Performed by: NURSE PRACTITIONER

## 2021-09-22 NOTE — ASSESSMENT & PLAN NOTE
On exam, small uvula, no cerumen impaction, no serous fluid  Audiogram today indicating normal hearing sloping to mild SNHL in high frequencies, symmetrical, Tymps type A, 100% Word discrimination    Offered options for bilateral SNHL including acceptance, lifestyle changes such as moving closer to those who are speaking, or hearing amplification  She would qualify for hearing amplification based on audiogram although loss is mild  Discussed hearing aid options including facilities to obtain a hearing aid from as well as costs and benefits  Discussed that quality of life may improve with hearing amplification and she agreed to consider    Pt will follow up with our office every two years with repeat audiogram, sooner if changes

## 2021-09-22 NOTE — PROGRESS NOTES
ENT HEARING EVALUATION    Name:  Jose Alford  :  1965  Age:  60 y o  Date of Evaluation: 21     History: ENT Audiogram  Reason for visit: Jose Alford is being seen today at the request of YANG Garcia for an evaluation of hearing as part of their initial ENT visit  EVALUATION:    Otoscopic Evaluation:   Right Ear: Clear and healthy ear canal and tympanic membrane   Left Ear: Clear and healthy ear canal and tympanic membrane    Tympanometry:   Right: Type A - normal middle ear pressure and compliance   Left: Type A - normal middle ear pressure and compliance    Audiogram Results:  Pure tone testing revealed a normal sloping to moderate sensorineural hearing loss at 4k Hz rising back to normal hearing bilaterally  SRT and PTA are in agreement indicating good test reliability  Word recognition scores were excellent bilaterally       *see attached audiogram      RECOMMENDATIONS:  Annual hearing eval, Consult ENT and Hearing Protection      Edith Garcia , CCC-A  Clinical Audiologist

## 2021-09-22 NOTE — PROGRESS NOTES
Assessment/Plan:    Sensorineural hearing loss (SNHL), bilateral  On exam, small uvula, no cerumen impaction, no serous fluid  Audiogram today indicating normal hearing sloping to mild SNHL in high frequencies, symmetrical, Tymps type A, 100% Word discrimination    Offered options for bilateral SNHL including acceptance, lifestyle changes such as moving closer to those who are speaking, or hearing amplification  She would qualify for hearing amplification based on audiogram although loss is mild  Discussed hearing aid options including facilities to obtain a hearing aid from as well as costs and benefits  Discussed that quality of life may improve with hearing amplification and she agreed to consider  Pt will follow up with our office every two years with repeat audiogram, sooner if changes                 Diagnoses and all orders for this visit:    Sensorineural hearing loss (SNHL), bilateral  -     Ambulatory referral to Audiology          Subjective:      Patient ID: Naila Joshi is a 60 y o  female  Presents today as a new patient due to hearing loss  Gradually worsening hearing  More left vs right  No otalgia or otorrhea  No current hearing aids  No prior ear surgery  No other ENT concerns  The following portions of the patient's history were reviewed and updated as appropriate: allergies, current medications, past family history, past medical history, past surgical history and problem list     Review of Systems   Constitutional: Negative  HENT: Positive for hearing loss  Negative for congestion, ear discharge, ear pain, nosebleeds, postnasal drip, rhinorrhea, sinus pressure, sinus pain, sore throat, tinnitus and voice change  Eyes: Negative  Respiratory: Negative for chest tightness and shortness of breath  Cardiovascular: Negative  Gastrointestinal: Negative  Endocrine: Negative  Musculoskeletal: Negative  Skin: Negative for color change     Neurological: Negative for dizziness, numbness and headaches  Psychiatric/Behavioral: Negative  Objective:      Temp 97 8 °F (36 6 °C) (Temporal)   Ht 5' 5" (1 651 m)   Wt 86 2 kg (190 lb)   BMI 31 62 kg/m²          Physical Exam  Constitutional:       Appearance: She is well-developed  HENT:      Head: Normocephalic  Right Ear: Hearing, tympanic membrane, ear canal and external ear normal  No decreased hearing noted  No drainage or tenderness  Tympanic membrane is not perforated or erythematous  Left Ear: Hearing, tympanic membrane, ear canal and external ear normal  No decreased hearing noted  No drainage or tenderness  Tympanic membrane is not perforated or erythematous  Nose: Nose normal  No nasal deformity or septal deviation  Mouth/Throat:      Mouth: Mucous membranes are not pale and not dry  No oral lesions  Dentition: Normal dentition  Pharynx: Uvula midline  No oropharyngeal exudate  Neck:      Trachea: No tracheal deviation  Cardiovascular:      Rate and Rhythm: Normal rate  Pulmonary:      Effort: Pulmonary effort is normal  No accessory muscle usage or respiratory distress  Musculoskeletal:      Right shoulder: Normal range of motion  Cervical back: Full passive range of motion without pain, normal range of motion and neck supple  Lymphadenopathy:      Cervical: No cervical adenopathy  Skin:     General: Skin is warm and dry  Neurological:      Mental Status: She is alert and oriented to person, place, and time  Cranial Nerves: No cranial nerve deficit  Sensory: No sensory deficit  Psychiatric:         Behavior: Behavior is cooperative

## 2021-11-04 ENCOUNTER — TELEMEDICINE (OUTPATIENT)
Dept: FAMILY MEDICINE CLINIC | Facility: CLINIC | Age: 56
End: 2021-11-04
Payer: COMMERCIAL

## 2021-11-04 DIAGNOSIS — Z03.818 ENCOUNTER FOR OBSERVATION FOR SUSPECTED EXPOSURE TO OTHER BIOLOGICAL AGENTS RULED OUT: ICD-10-CM

## 2021-11-04 DIAGNOSIS — B34.9 VIRAL INFECTION, UNSPECIFIED: ICD-10-CM

## 2021-11-04 PROCEDURE — U0003 INFECTIOUS AGENT DETECTION BY NUCLEIC ACID (DNA OR RNA); SEVERE ACUTE RESPIRATORY SYNDROME CORONAVIRUS 2 (SARS-COV-2) (CORONAVIRUS DISEASE [COVID-19]), AMPLIFIED PROBE TECHNIQUE, MAKING USE OF HIGH THROUGHPUT TECHNOLOGIES AS DESCRIBED BY CMS-2020-01-R: HCPCS | Performed by: NURSE PRACTITIONER

## 2021-11-04 PROCEDURE — 99213 OFFICE O/P EST LOW 20 MIN: CPT | Performed by: NURSE PRACTITIONER

## 2021-11-04 PROCEDURE — U0005 INFEC AGEN DETEC AMPLI PROBE: HCPCS | Performed by: NURSE PRACTITIONER

## 2021-11-05 ENCOUNTER — TELEPHONE (OUTPATIENT)
Dept: FAMILY MEDICINE CLINIC | Facility: CLINIC | Age: 56
End: 2021-11-05

## 2021-11-08 ENCOUNTER — TELEPHONE (OUTPATIENT)
Dept: FAMILY MEDICINE CLINIC | Facility: CLINIC | Age: 56
End: 2021-11-08

## 2021-11-12 ENCOUNTER — TELEMEDICINE (OUTPATIENT)
Dept: FAMILY MEDICINE CLINIC | Facility: CLINIC | Age: 56
End: 2021-11-12
Payer: COMMERCIAL

## 2021-11-12 DIAGNOSIS — U07.1 COVID-19: Primary | ICD-10-CM

## 2021-11-12 PROCEDURE — 99213 OFFICE O/P EST LOW 20 MIN: CPT | Performed by: NURSE PRACTITIONER

## 2021-11-12 PROCEDURE — 1036F TOBACCO NON-USER: CPT | Performed by: NURSE PRACTITIONER

## 2021-11-12 RX ORDER — AZITHROMYCIN 250 MG/1
TABLET, FILM COATED ORAL
Qty: 6 TABLET | Refills: 0 | Status: SHIPPED | OUTPATIENT
Start: 2021-11-12 | End: 2021-11-16

## 2021-11-15 ENCOUNTER — TELEPHONE (OUTPATIENT)
Dept: FAMILY MEDICINE CLINIC | Facility: CLINIC | Age: 56
End: 2021-11-15

## 2021-11-17 ENCOUNTER — TELEPHONE (OUTPATIENT)
Dept: FAMILY MEDICINE CLINIC | Facility: CLINIC | Age: 56
End: 2021-11-17

## 2022-03-24 NOTE — PROGRESS NOTES
Assessment  1  Otitis media (382 9) (H66 90)   2  Acute bacterial sinusitis (461 9) (J01 90,B96 89)    Plan   Otitis media    · Benzonatate 100 MG Oral Capsule; TAKE 1-2 CAPSULES EVERY 12 HOURS AS  DIRECTED    Amoxicillin-Pot Clavulanate 875-125 MG Oral Tablet; 1 BID TAKE WITH FOOD; Therapy: 85MPB7555 to (Last Candi العراقي)  Requested for: 91Zjo7635; Status: ACTIVE Ordered  Rx By: Stephanie Levin; Dispense: 0 Days ; #:20 Tablet; Refill: 0;   For: Otitis media; BRYSON = N; Verified Transmission to [facility] [address] [facility]; Last Updated By: [employer]; 9/25/2017 11:13:51 AM     Discussion/Summary    Continue tylenol for pain and or feverprn not resolving/improving  The patient was counseled regarding instructions for management,-- risk factor reductions,-- impressions,-- risks and benefits of treatment options,-- importance of compliance with treatment  Possible side effects of new medications were reviewed with the patient/guardian today  The treatment plan was reviewed with the patient/guardian  The patient/guardian understands and agrees with the treatment plan      Chief Complaint  patient c/o runny nose, green nasal discharge , occasional cough , post nasal drip x 1 day sl/cma      History of Present Illness  HPI: New c/o onset sinus pain and pressure with associated sore throat and a cough  Denies wheezing  SHe reports fever 101 6 this afternoon  FEver tx with tylenol this afternoon  Pt notes yellow-green nasal discharge  No ear pain  Pt notes pain in the frontal and maxillary areas  + illness exposure at work, pt is nurse at [employer]  No additional tx at home other than her fexofenadine for her typical seasonal allergy  Review of Systems    Constitutional: fever, but-- no chills  ENT: as noted in HPI  Cardiovascular: no complaints of slow or fast heart rate, no chest pain, no palpitations, no leg claudication or lower extremity edema     Respiratory: cough, but-- no shortness of Order signed   breath,-- no wheezing-- and-- no shortness of breath during exertion  Gastrointestinal: no complaints of abdominal pain, no constipation, no nausea or diarrhea, no vomiting, no bloody stools  Active Problems  1  Benign hypertension (401 1) (I10)   2  Depression (311) (F32 9)   3  Encounter for screening for malignant neoplasm of colon (V76 51) (Z12 11)   4  Encounter for screening mammogram for malignant neoplasm of breast (V76 12)   (Z12 31)   5  Hyperlipidemia (272 4) (E78 5)   6  Insomnia (780 52) (G47 00)   7  Other specified anxiety disorders (300 09) (F41 8)   8  Postmenopausal bleeding (627 1) (N95 0)   9  Vitamin D deficiency (268 9) (E55 9)   10  Well adult on routine health check (V70 0) (Z00 00)    Past Medical History  1  History of Acute atopic conjunctivitis, unspecified laterality (372 05) (H10 10)   2  History of Acute reaction to stress (308 9) (F43 0)   3  History of Acute upper respiratory infection (465 9) (J06 9)   4  History of Arthralgia (719 40) (M25 50)   5  History of Asthma with acute exacerbation (493 92) (J45 901)   6  History of Cervicalgia (723 1) (M54 2)   7  History of Contact dermatitis due to plant (692 6) (L25 5)   8  History of Cough (786 2) (R05)   9  History of Discoloration of skin of lower leg (709 00) (L81 9)   10  History of Encounter for pre-employment health screening examination (V70 5) (Z02 1)   11  History of candidal vulvovaginitis (V13 29) (Z86 19)   12  History of fatigue (V13 89) (Z87 898)   13  History of fever (V13 89) (Z87 898)   14  History of nicotine dependence (V15 82) (Z87 891)   15  History of orthostatic hypotension (V12 59) (Z86 79)   16  History of syncope (V15 89) (Z87 898)   17  History of Myalgia And Myositis (729 1)   18  History of Skin rash (782 1) (R21)   19  History of Transient disorder of initiating or maintaining sleep (307 41) (F51 02)   20  Viral gastroenteritis (008 8) (A08 4)    Family History  Mother    1   Family history of diabetes mellitus (V18 0) (Z83 3)   2  Family history of hypertension (V17 49) (Z82 49)  Father    3  Family history of hypertension (V17 49) (Z82 49)  Family History Reviewed: The family history was reviewed and updated today  Social History   · History of Current Some Day Smoker (305 1)   · Former smoker (N78 35) (V12 975)  The social history was reviewed and updated today  Current Meds   1  ALPRAZolam 0 5 MG Oral Tablet; ONE TAB PO BID PRN; Therapy: 65GYL0207 to (Last Rx:92Rbf9727)  Requested for: 92Ysm5062 Ordered   2  BuPROPion HCl ER (XL) 300 MG Oral Tablet Extended Release 24 Hour; Therapy: 73OWS8136 to Recorded   3  Lexapro 10 MG Oral Tablet; Therapy: 87FFU8226 to Recorded   4  Metoprolol Succinate ER 25 MG Oral Tablet Extended Release 24 Hour; 1 every day; Therapy: 38XYO4796 to (Last Rx:08Mar2017)  Requested for: 99CGZ4331 Ordered   5  Olopatadine HCl - 0 1 % Ophthalmic Solution; 1 gtt OU BID; Therapy: 65CLA1480 to (Last Rx:27Apr2017)  Requested for: 42Rpf8754 Ordered   6  Ondansetron HCl - 4 MG Oral Tablet; 1 tablet every 4 hours as needed; Therapy: 51EZB6791 to (Last Rx:20Jan2017)  Requested for: 20Jan2017 Ordered   7  Vytorin 10-20 MG Oral Tablet; Take 1 tablet daily; Therapy: 79Swn5671 to (Last Rx:02Ktm8970)  Requested for: 04Edv9750 Ordered    The medication list was reviewed and updated today  Allergies  1  PREDNISONE    Vitals   Recorded: 38ZLR6615 05:40PM   Temperature 97 4 F   Heart Rate 88   Respiration 16   Systolic 330   Diastolic 80   Height 5 ft 4 in   Weight 183 lb 6 4 oz   BMI Calculated 31 48   BSA Calculated 1 89     Physical Exam    Constitutional   General appearance: No acute distress, well appearing and well nourished  Eyes   Conjunctiva and lids: No swelling, erythema or discharge  Ears, Nose, Mouth, and Throat   External inspection of ears and nose: Normal     Otoscopic examination: Abnormal  -- rt tm erythematous, retraction bialt     Nasal mucosa, septum, and turbinates: Abnormal  -- erythema, edema, bilat maxillary tenderness, right max swelling  Oropharynx: Normal with no erythema, edema, exudate or lesions  Pulmonary   Respiratory effort: No increased work of breathing or signs of respiratory distress  Auscultation of lungs: Clear to auscultation  Cardiovascular   Auscultation of heart: Normal rate and rhythm, normal S1 and S2, without murmurs  Attending Note  Collaborating Physician Note: Collaborating Note: I agree with the Advanced Practitioner note        Signatures   Electronically signed by : Melba Bland; Sep 21 2017  6:00PM EST                       (Author)    Electronically signed by : Ciara Mcnally DO; Sep 26 2017 10:48AM EST                       (Author)

## 2022-06-16 ENCOUNTER — VBI (OUTPATIENT)
Dept: ADMINISTRATIVE | Facility: OTHER | Age: 57
End: 2022-06-16

## 2022-06-21 ENCOUNTER — OFFICE VISIT (OUTPATIENT)
Dept: FAMILY MEDICINE CLINIC | Facility: CLINIC | Age: 57
End: 2022-06-21
Payer: COMMERCIAL

## 2022-06-21 VITALS
SYSTOLIC BLOOD PRESSURE: 116 MMHG | RESPIRATION RATE: 18 BRPM | HEART RATE: 77 BPM | DIASTOLIC BLOOD PRESSURE: 84 MMHG | HEIGHT: 65 IN | WEIGHT: 191.2 LBS | TEMPERATURE: 96.9 F | OXYGEN SATURATION: 97 % | BODY MASS INDEX: 31.86 KG/M2

## 2022-06-21 DIAGNOSIS — E78.5 HYPERLIPIDEMIA, UNSPECIFIED HYPERLIPIDEMIA TYPE: ICD-10-CM

## 2022-06-21 DIAGNOSIS — G47.10 HYPERSOMNIA: Primary | ICD-10-CM

## 2022-06-21 DIAGNOSIS — F33.9 DEPRESSION, RECURRENT (HCC): ICD-10-CM

## 2022-06-21 DIAGNOSIS — I10 BENIGN HYPERTENSION: ICD-10-CM

## 2022-06-21 DIAGNOSIS — Z13.228 SCREENING FOR ENDOCRINE, NUTRITIONAL, METABOLIC AND IMMUNITY DISORDER: ICD-10-CM

## 2022-06-21 DIAGNOSIS — Z79.899 HIGH RISK MEDICATION USE: ICD-10-CM

## 2022-06-21 DIAGNOSIS — Z13.0 SCREENING FOR ENDOCRINE, NUTRITIONAL, METABOLIC AND IMMUNITY DISORDER: ICD-10-CM

## 2022-06-21 DIAGNOSIS — F32.4 MAJOR DEPRESSIVE DISORDER WITH SINGLE EPISODE, IN PARTIAL REMISSION (HCC): ICD-10-CM

## 2022-06-21 DIAGNOSIS — Z13.21 SCREENING FOR ENDOCRINE, NUTRITIONAL, METABOLIC AND IMMUNITY DISORDER: ICD-10-CM

## 2022-06-21 DIAGNOSIS — Z13.29 SCREENING FOR ENDOCRINE, NUTRITIONAL, METABOLIC AND IMMUNITY DISORDER: ICD-10-CM

## 2022-06-21 DIAGNOSIS — H01.136 ECZEMA OF LEFT EYELID: ICD-10-CM

## 2022-06-21 PROCEDURE — 3725F SCREEN DEPRESSION PERFORMED: CPT | Performed by: NURSE PRACTITIONER

## 2022-06-21 PROCEDURE — 1036F TOBACCO NON-USER: CPT | Performed by: NURSE PRACTITIONER

## 2022-06-21 PROCEDURE — 99213 OFFICE O/P EST LOW 20 MIN: CPT | Performed by: NURSE PRACTITIONER

## 2022-06-21 PROCEDURE — 3008F BODY MASS INDEX DOCD: CPT | Performed by: NURSE PRACTITIONER

## 2022-06-21 RX ORDER — ALCLOMETASONE DIPROPIONATE 0.5 MG/G
CREAM TOPICAL 2 TIMES DAILY
Qty: 30 G | Refills: 0 | Status: SHIPPED | OUTPATIENT
Start: 2022-06-21

## 2022-06-21 RX ORDER — ALPRAZOLAM 0.25 MG/1
TABLET ORAL
COMMUNITY
Start: 2022-06-13

## 2022-06-21 NOTE — LETTER
June 21, 2022     Patient: Kym Dozier  YOB: 1965  Date of Visit: 6/21/2022      To Whom it May Concern:    Kym Dozier is under my professional care  Paige Zapata was seen in my office on 6/21/2022  Paige Zapata may return to work on 6/22/22  Excused 6/19-6/21/22  If you have any questions or concerns, please don't hesitate to call           Sincerely,          YANG Johnson        CC: No Recipients

## 2022-06-21 NOTE — PROGRESS NOTES
Assessment/Plan     hypersomnia likely multi factor->recent abrupt d/c cymbalta, increased benzo use, stress  cannot r/o organic->vit deficiency, thyroid imbalance, anemia  The case discussed with patient using patient centered shared decision making  The patient was counseled regarding instructions for management,-- risk factor reductions,-- prognosis,-- impressions,-- risks and benefits of treatment options,-- importance of compliance with treatment  I have reviewed the instructions with the patient, answering all questions to her satisfaction  Reassured that serious underlying cause for the fatigue is very unlikely  Discussed lifestyle modification as means of resolving problem  See orders for lab evaluation  Follow up in 2 weeks or as needed       Subjective     Gordy Santiago is a 60 y o  female who presents for evaluation of fatigue  Symptoms began 2 weeks ago  Patient advises that her psychiatrist changed meds 1 month ago  She abruptly stopped cymbalta 1 month  She is taking more xanax 2/2 increased anxiety  Symptoms of her fatigue have been excessive sleepiness-sleeps 12 hours per day    Patient describes the following psychological symptoms: stress at work and anxiety/depression  Patient denies cold intolerance, fever, GI blood loss, significant change in weight, symptoms of arthritis and unusual rashes  Symptoms have progressed to a point and plateaued  Symptom severity: struggles to carry out day to day responsibilities    Previous visits for this problem: none  The following portions of the patient's history were reviewed and updated as appropriate: allergies, current medications, past family history, past medical history, past social history, past surgical history and problem list     Review of Systems  Pertinent items are noted in HPI       Objective   Vitals:    06/21/22 1659   BP: 116/84   BP Location: Left arm   Patient Position: Sitting   Cuff Size: Large   Pulse: 77   Resp: 18 Temp: (!) 96 9 °F (36 1 °C)   SpO2: 97%   Weight: 86 7 kg (191 lb 3 2 oz)   Height: 5' 5" (1 651 m)       Physical Exam  Vitals and nursing note reviewed  Constitutional:       General: She is not in acute distress  Appearance: Normal appearance  HENT:      Head: Normocephalic and atraumatic  Neck:      Thyroid: No thyromegaly  Cardiovascular:      Rate and Rhythm: Normal rate and regular rhythm  Pulses: Normal pulses  Heart sounds: Normal heart sounds  Pulmonary:      Effort: Pulmonary effort is normal       Breath sounds: Normal breath sounds  Musculoskeletal:      Right lower leg: No edema  Left lower leg: No edema  Lymphadenopathy:      Cervical: No cervical adenopathy  Skin:     Coloration: Skin is not pale  Neurological:      General: No focal deficit present  Mental Status: She is alert     Psychiatric:         Mood and Affect: Mood normal

## 2022-06-25 LAB
25(OH)D3+25(OH)D2 SERPL-MCNC: 32.7 NG/ML (ref 30–100)
ALBUMIN SERPL-MCNC: 4.6 G/DL (ref 3.8–4.9)
ALBUMIN/GLOB SERPL: 2.1 {RATIO} (ref 1.2–2.2)
ALP SERPL-CCNC: 104 IU/L (ref 44–121)
ALT SERPL-CCNC: 16 IU/L (ref 0–32)
AST SERPL-CCNC: 18 IU/L (ref 0–40)
BASOPHILS # BLD AUTO: 0.1 X10E3/UL (ref 0–0.2)
BASOPHILS NFR BLD AUTO: 1 %
BILIRUB SERPL-MCNC: 0.3 MG/DL (ref 0–1.2)
BUN SERPL-MCNC: 13 MG/DL (ref 6–24)
BUN/CREAT SERPL: 12 (ref 9–23)
CALCIUM SERPL-MCNC: 9.6 MG/DL (ref 8.7–10.2)
CHLORIDE SERPL-SCNC: 103 MMOL/L (ref 96–106)
CHOLEST SERPL-MCNC: 253 MG/DL (ref 100–199)
CO2 SERPL-SCNC: 24 MMOL/L (ref 20–29)
CREAT SERPL-MCNC: 1.06 MG/DL (ref 0.57–1)
EGFR: 61 ML/MIN/1.73
EOSINOPHIL # BLD AUTO: 0.2 X10E3/UL (ref 0–0.4)
EOSINOPHIL NFR BLD AUTO: 3 %
ERYTHROCYTE [DISTWIDTH] IN BLOOD BY AUTOMATED COUNT: 12.6 % (ref 11.7–15.4)
GLOBULIN SER-MCNC: 2.2 G/DL (ref 1.5–4.5)
GLUCOSE SERPL-MCNC: 99 MG/DL (ref 65–99)
HCT VFR BLD AUTO: 46.3 % (ref 34–46.6)
HDLC SERPL-MCNC: 39 MG/DL
HGB BLD-MCNC: 15.3 G/DL (ref 11.1–15.9)
IMM GRANULOCYTES # BLD: 0 X10E3/UL (ref 0–0.1)
IMM GRANULOCYTES NFR BLD: 0 %
LDLC SERPL CALC-MCNC: 181 MG/DL (ref 0–99)
LYMPHOCYTES # BLD AUTO: 1.6 X10E3/UL (ref 0.7–3.1)
LYMPHOCYTES NFR BLD AUTO: 25 %
MCH RBC QN AUTO: 29.4 PG (ref 26.6–33)
MCHC RBC AUTO-ENTMCNC: 33 G/DL (ref 31.5–35.7)
MCV RBC AUTO: 89 FL (ref 79–97)
MONOCYTES # BLD AUTO: 0.4 X10E3/UL (ref 0.1–0.9)
MONOCYTES NFR BLD AUTO: 7 %
NEUTROPHILS # BLD AUTO: 4.1 X10E3/UL (ref 1.4–7)
NEUTROPHILS NFR BLD AUTO: 64 %
PLATELET # BLD AUTO: 320 X10E3/UL (ref 150–450)
POTASSIUM SERPL-SCNC: 5 MMOL/L (ref 3.5–5.2)
PROT SERPL-MCNC: 6.8 G/DL (ref 6–8.5)
RBC # BLD AUTO: 5.21 X10E6/UL (ref 3.77–5.28)
SL AMB VLDL CHOLESTEROL CALC: 33 MG/DL (ref 5–40)
SODIUM SERPL-SCNC: 142 MMOL/L (ref 134–144)
TRIGL SERPL-MCNC: 178 MG/DL (ref 0–149)
TSH SERPL DL<=0.005 MIU/L-ACNC: 2.26 UIU/ML (ref 0.45–4.5)
VIT B12 SERPL-MCNC: 484 PG/ML (ref 232–1245)
WBC # BLD AUTO: 6.5 X10E3/UL (ref 3.4–10.8)

## 2022-06-30 ENCOUNTER — TELEPHONE (OUTPATIENT)
Dept: FAMILY MEDICINE CLINIC | Facility: CLINIC | Age: 57
End: 2022-06-30

## 2022-06-30 NOTE — TELEPHONE ENCOUNTER
I sent a my chart message on 6/26:    Your lab results are acceptable overall  However cholesterol is quite high   Please discuss further with Dr Carissa Rome at 7/8 appointment

## 2022-07-01 ENCOUNTER — RA CDI HCC (OUTPATIENT)
Dept: OTHER | Facility: HOSPITAL | Age: 57
End: 2022-07-01

## 2022-07-01 NOTE — PROGRESS NOTES
[facility]  coding opportunities       Chart reviewed, no opportunity found: CHART REVIEWED, NO OPPORTUNITY FOUND        Patients Insurance        Commercial Insurance: Danial Bennett

## 2022-07-08 ENCOUNTER — OFFICE VISIT (OUTPATIENT)
Dept: FAMILY MEDICINE CLINIC | Facility: CLINIC | Age: 57
End: 2022-07-08
Payer: COMMERCIAL

## 2022-07-08 VITALS
DIASTOLIC BLOOD PRESSURE: 85 MMHG | HEIGHT: 65 IN | RESPIRATION RATE: 14 BRPM | WEIGHT: 196 LBS | TEMPERATURE: 97.5 F | BODY MASS INDEX: 32.65 KG/M2 | HEART RATE: 72 BPM | SYSTOLIC BLOOD PRESSURE: 142 MMHG

## 2022-07-08 DIAGNOSIS — I10 BENIGN HYPERTENSION: ICD-10-CM

## 2022-07-08 DIAGNOSIS — F33.9 DEPRESSION, RECURRENT (HCC): ICD-10-CM

## 2022-07-08 DIAGNOSIS — Z12.31 ENCOUNTER FOR SCREENING MAMMOGRAM FOR BREAST CANCER: ICD-10-CM

## 2022-07-08 DIAGNOSIS — E78.5 HYPERLIPIDEMIA, UNSPECIFIED HYPERLIPIDEMIA TYPE: Primary | ICD-10-CM

## 2022-07-08 PROCEDURE — 99214 OFFICE O/P EST MOD 30 MIN: CPT | Performed by: FAMILY MEDICINE

## 2022-07-08 NOTE — PATIENT INSTRUCTIONS
Recent Results (from the past 672 hour(s))   Tammy Russell Default    Collection Time: 06/24/22  7:57 AM   Result Value Ref Range    White Blood Cell Count 6 5 3 4 - 10 8 x10E3/uL    Red Blood Cell Count 5 21 3 77 - 5 28 x10E6/uL    Hemoglobin 15 3 11 1 - 15 9 g/dL    HCT 46 3 34 0 - 46 6 %    MCV 89 79 - 97 fL    MCH 29 4 26 6 - 33 0 pg    MCHC 33 0 31 5 - 35 7 g/dL    RDW 12 6 11 7 - 15 4 %    Platelet Count 076 636 - 450 x10E3/uL    Neutrophils 64 Not Estab  %    Lymphocytes 25 Not Estab  %    Monocytes 7 Not Estab  %    Eosinophils 3 Not Estab  %    Basophils PCT 1 Not Estab  %    Neutrophils (Absolute) 4 1 1 4 - 7 0 x10E3/uL    Lymphocytes (Absolute) 1 6 0 7 - 3 1 x10E3/uL    Monocytes (Absolute) 0 4 0 1 - 0 9 x10E3/uL    Eosinophils (Absolute) 0 2 0 0 - 0 4 x10E3/uL    Basophils ABS 0 1 0 0 - 0 2 x10E3/uL    Immature Granulocytes 0 Not Estab  %    Immature Granulocytes (Absolute) 0 0 0 0 - 0 1 x10E3/uL   Comprehensive metabolic panel    Collection Time: 06/24/22  7:57 AM   Result Value Ref Range    Glucose, Random 99 65 - 99 mg/dL    BUN 13 6 - 24 mg/dL    Creatinine 1 06 (H) 0 57 - 1 00 mg/dL    eGFR 61 >59 mL/min/1 73    SL AMB BUN/CREATININE RATIO 12 9 - 23    Sodium 142 134 - 144 mmol/L    Potassium 5 0 3 5 - 5 2 mmol/L    Chloride 103 96 - 106 mmol/L    CO2 24 20 - 29 mmol/L    CALCIUM 9 6 8 7 - 10 2 mg/dL    Protein, Total 6 8 6 0 - 8 5 g/dL    Albumin 4 6 3 8 - 4 9 g/dL    Globulin, Total 2 2 1 5 - 4 5 g/dL    Albumin/Globulin Ratio 2 1 1 2 - 2 2    TOTAL BILIRUBIN 0 3 0 0 - 1 2 mg/dL    Alk Phos Isoenzymes 104 44 - 121 IU/L    AST 18 0 - 40 IU/L    ALT 16 0 - 32 IU/L   Lipid panel    Collection Time: 06/24/22  7:57 AM   Result Value Ref Range    Cholesterol, Total 253 (H) 100 - 199 mg/dL    Triglycerides 178 (H) 0 - 149 mg/dL    HDL 39 (L) >39 mg/dL    VLDL Cholesterol Calculated 33 5 - 40 mg/dL    LDL Calculated 181 (H) 0 - 99 mg/dL   Vitamin D 25 hydroxy    Collection Time: 06/24/22  7:57 AM   Result Value Ref Range    25-HYDROXY VIT D 32 7 30 0 - 100 0 ng/mL   TSH, 3rd generation with Free T4 reflex    Collection Time: 06/24/22  7:57 AM   Result Value Ref Range    TSH 2 260 0 450 - 4 500 uIU/mL   Vitamin B12    Collection Time: 06/24/22  7:57 AM   Result Value Ref Range    Vitamin B-12 484 232 - 1,245 pg/mL

## 2022-07-08 NOTE — PROGRESS NOTES
Chief Complaint   Patient presents with    Follow-up     Chronic conditions         Patient ID: Vincent Boy is a 60 y o  female  HPI  Pt is seeing for f/u HLD, HTN, Depression -  All stable    The following portions of the patient's history were reviewed and updated as appropriate: allergies, current medications, past family history, past medical history, past social history, past surgical history and problem list     Review of Systems   Constitutional: Negative  Respiratory: Negative  Cardiovascular: Negative  Gastrointestinal: Negative  Genitourinary: Negative  Musculoskeletal: Negative  Skin: Negative  Neurological: Negative  Current Outpatient Medications   Medication Sig Dispense Refill    alclomethasone (ACLOVATE) 0 05 % cream Apply topically 2 (two) times a day 30 g 0    ALPRAZolam (XANAX) 0 25 mg tablet ONE TAB A DAY AS NEEDED      buPROPion (WELLBUTRIN XL) 300 mg 24 hr tablet Take 1 tablet (300 mg total) by mouth every morning 90 tablet 3    fexofenadine (ALLEGRA) 30 MG tablet Take 30 mg by mouth as needed        metoprolol succinate (TOPROL-XL) 25 mg 24 hr tablet Take 1 tablet (25 mg total) by mouth daily at bedtime 90 tablet 3    olopatadine (PATANOL) 0 1 % ophthalmic solution Administer 1 drop to both eyes 2 (two) times a day 5 mL 6    montelukast (SINGULAIR) 10 mg tablet Take 1 tablet (10 mg total) by mouth daily at bedtime (Patient not taking: No sig reported) 30 tablet 6     No current facility-administered medications for this visit  Objective:    /85 Comment: by MD  Pulse 72   Temp 97 5 °F (36 4 °C) (Temporal)   Resp 14   Ht 5' 5" (1 651 m)   Wt 88 9 kg (196 lb)   BMI 32 62 kg/m²        Physical Exam  Constitutional:       General: She is not in acute distress  Appearance: She is obese  She is not ill-appearing  Cardiovascular:      Rate and Rhythm: Normal rate and regular rhythm  Heart sounds: No murmur heard    Pulmonary: Effort: Pulmonary effort is normal  No respiratory distress  Breath sounds: No wheezing, rhonchi or rales  Musculoskeletal:      Right lower leg: No edema  Left lower leg: No edema  Neurological:      General: No focal deficit present  Mental Status: She is alert and oriented to person, place, and time  Psychiatric:         Mood and Affect: Mood normal          Thought Content: Thought content normal            Labs in chart were reviewed    Recent Results (from the past 672 hour(s))   Tammy Russell Default    Collection Time: 06/24/22  7:57 AM   Result Value Ref Range    White Blood Cell Count 6 5 3 4 - 10 8 x10E3/uL    Red Blood Cell Count 5 21 3 77 - 5 28 x10E6/uL    Hemoglobin 15 3 11 1 - 15 9 g/dL    HCT 46 3 34 0 - 46 6 %    MCV 89 79 - 97 fL    MCH 29 4 26 6 - 33 0 pg    MCHC 33 0 31 5 - 35 7 g/dL    RDW 12 6 11 7 - 15 4 %    Platelet Count 402 514 - 450 x10E3/uL    Neutrophils 64 Not Estab  %    Lymphocytes 25 Not Estab  %    Monocytes 7 Not Estab  %    Eosinophils 3 Not Estab  %    Basophils PCT 1 Not Estab  %    Neutrophils (Absolute) 4 1 1 4 - 7 0 x10E3/uL    Lymphocytes (Absolute) 1 6 0 7 - 3 1 x10E3/uL    Monocytes (Absolute) 0 4 0 1 - 0 9 x10E3/uL    Eosinophils (Absolute) 0 2 0 0 - 0 4 x10E3/uL    Basophils ABS 0 1 0 0 - 0 2 x10E3/uL    Immature Granulocytes 0 Not Estab  %    Immature Granulocytes (Absolute) 0 0 0 0 - 0 1 x10E3/uL   Comprehensive metabolic panel    Collection Time: 06/24/22  7:57 AM   Result Value Ref Range    Glucose, Random 99 65 - 99 mg/dL    BUN 13 6 - 24 mg/dL    Creatinine 1 06 (H) 0 57 - 1 00 mg/dL    eGFR 61 >59 mL/min/1 73    SL AMB BUN/CREATININE RATIO 12 9 - 23    Sodium 142 134 - 144 mmol/L    Potassium 5 0 3 5 - 5 2 mmol/L    Chloride 103 96 - 106 mmol/L    CO2 24 20 - 29 mmol/L    CALCIUM 9 6 8 7 - 10 2 mg/dL    Protein, Total 6 8 6 0 - 8 5 g/dL    Albumin 4 6 3 8 - 4 9 g/dL    Globulin, Total 2 2 1 5 - 4 5 g/dL    Albumin/Globulin Ratio 2 1 1 2 - 2 2 TOTAL BILIRUBIN 0 3 0 0 - 1 2 mg/dL    Alk Phos Isoenzymes 104 44 - 121 IU/L    AST 18 0 - 40 IU/L    ALT 16 0 - 32 IU/L   Lipid panel    Collection Time: 06/24/22  7:57 AM   Result Value Ref Range    Cholesterol, Total 253 (H) 100 - 199 mg/dL    Triglycerides 178 (H) 0 - 149 mg/dL    HDL 39 (L) >39 mg/dL    VLDL Cholesterol Calculated 33 5 - 40 mg/dL    LDL Calculated 181 (H) 0 - 99 mg/dL   Vitamin D 25 hydroxy    Collection Time: 06/24/22  7:57 AM   Result Value Ref Range    25-HYDROXY VIT D 32 7 30 0 - 100 0 ng/mL   TSH, 3rd generation with Free T4 reflex    Collection Time: 06/24/22  7:57 AM   Result Value Ref Range    TSH 2 260 0 450 - 4 500 uIU/mL   Vitamin B12    Collection Time: 06/24/22  7:57 AM   Result Value Ref Range    Vitamin B-12 484 232 - 1,245 pg/mL       Assessment/Plan:         Diagnoses and all orders for this visit:    Hyperlipidemia, unspecified hyperlipidemia type    Benign hypertension    Depression, recurrent ([facility] [address] )    Encounter for screening mammogram for breast cancer  -     Mammo screening bilateral w cad; Future      all above are stable    Cont meds    BMI Counseling: Body mass index is 32 62 kg/m²  Discussed the patient's BMI with her  The BMI is above normal  Nutrition recommendations include reducing portion sizes, decreasing overall calorie intake, 3-5 servings of fruits/vegetables daily, reducing fast food intake, consuming healthier snacks and decreasing soda and/or juice intake  Exercise recommendations include exercising 3-5 times per week         rto in 6 m           Mihir Grove MD

## 2022-08-16 ENCOUNTER — APPOINTMENT (EMERGENCY)
Dept: RADIOLOGY | Facility: HOSPITAL | Age: 57
End: 2022-08-16
Payer: OTHER MISCELLANEOUS

## 2022-08-16 ENCOUNTER — HOSPITAL ENCOUNTER (EMERGENCY)
Facility: HOSPITAL | Age: 57
Discharge: HOME | End: 2022-08-16
Attending: EMERGENCY MEDICINE
Payer: OTHER MISCELLANEOUS

## 2022-08-16 VITALS
DIASTOLIC BLOOD PRESSURE: 69 MMHG | HEART RATE: 69 BPM | TEMPERATURE: 98.2 F | OXYGEN SATURATION: 99 % | RESPIRATION RATE: 18 BRPM | SYSTOLIC BLOOD PRESSURE: 112 MMHG

## 2022-08-16 DIAGNOSIS — S30.0XXA CONTUSION OF SACRUM, INITIAL ENCOUNTER: ICD-10-CM
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
## 2022-08-16 PROCEDURE — 72220 X-RAY EXAM SACRUM TAILBONE: CPT

## 2022-08-16 PROCEDURE — 99284 EMERGENCY DEPT VISIT MOD MDM: CPT | Performed by: EMERGENCY MEDICINE

## 2022-08-16 PROCEDURE — 73502 X-RAY EXAM HIP UNI 2-3 VIEWS: CPT

## 2022-08-16 PROCEDURE — 72100 X-RAY EXAM L-S SPINE 2/3 VWS: CPT

## 2022-08-16 PROCEDURE — 99284 EMERGENCY DEPT VISIT MOD MDM: CPT

## 2022-08-16 RX ORDER — ACETAMINOPHEN 325 MG/1
975 TABLET ORAL ONCE
Status: COMPLETED | OUTPATIENT
Start: 2022-08-16 | End: 2022-08-16

## 2022-08-16 RX ORDER — NAPROXEN 500 MG/1
500 TABLET ORAL 2 TIMES DAILY WITH MEALS
Qty: 10 TABLET | Refills: 0 | Status: SHIPPED | OUTPATIENT
Start: 2022-08-16 | End: 2022-08-21

## 2022-08-16 RX ORDER — CYCLOBENZAPRINE HCL 10 MG
10 TABLET ORAL 2 TIMES DAILY PRN
Qty: 10 TABLET | Refills: 0 | Status: SHIPPED | OUTPATIENT
Start: 2022-08-16 | End: 2022-08-21

## 2022-08-16 RX ADMIN — ACETAMINOPHEN 975 MG: 325 TABLET, FILM COATED ORAL at 13:10

## 2022-08-16 NOTE — ED PROVIDER NOTES
History  Chief Complaint   Patient presents with    Fall     Patient was standing and fell backwards no dizziness noted  Patient states no loc or thinners or head strike  C/o right hip pain     24-year-old female presents with right hip pain and right lower back and sacral pain after she fell backwards lost her balance when she was standing  Denies any other injuries or complaints not on blood thinners  Able to bear weight did not ambulate after the fall  No numbness weakness tingling or any other neurologic symptoms  History provided by:  Patient   used: No        Prior to Admission Medications   Prescriptions Last Dose Informant Patient Reported? Taking?    ALPRAZolam (XANAX) 0 25 mg tablet   Yes No   Sig: ONE TAB A DAY AS NEEDED   alclomethasone (ACLOVATE) 0 05 % cream   No No   Sig: Apply topically 2 (two) times a day   buPROPion (WELLBUTRIN XL) 300 mg 24 hr tablet   No No   Sig: Take 1 tablet (300 mg total) by mouth every morning   fexofenadine (ALLEGRA) 30 MG tablet  Self Yes No   Sig: Take 30 mg by mouth as needed     metoprolol succinate (TOPROL-XL) 25 mg 24 hr tablet   No No   Sig: Take 1 tablet (25 mg total) by mouth daily at bedtime   montelukast (SINGULAIR) 10 mg tablet   No No   Sig: Take 1 tablet (10 mg total) by mouth daily at bedtime   Patient not taking: No sig reported   olopatadine (PATANOL) 0 1 % ophthalmic solution   No No   Sig: Administer 1 drop to both eyes 2 (two) times a day      Facility-Administered Medications: None       Past Medical History:   Diagnosis Date    Depression     Hyperlipidemia     Hypertension     Orthostatic hypotension 2011    Other specified anxiety disorders     PONV (postoperative nausea and vomiting)     Syncope     last episode 8/2018       Past Surgical History:   Procedure Laterality Date    CATARACT EXTRACTION Bilateral     COLONOSCOPY      DILATION AND EVACUATION      ENDOMETRIAL BIOPSY  03/2017    PA COLONOSCOPY FLX DX W/COLLJ SPEC WHEN PFRMD N/A 2018    Procedure: COLONOSCOPY;  Surgeon: Nhung Mcmillan MD;  Location: [facility]; Service: Gastroenterology       Family History   Problem Relation Age of Onset    Diabetes Mother     Hypertension Mother     Hyperlipidemia Mother     Hypertension Father     Hyperlipidemia Sister     Hypertension Sister     Diabetes Maternal Grandmother     No Known Problems Brother     No Known Problems Maternal Aunt     No Known Problems Maternal Uncle     No Known Problems Paternal Aunt     No Known Problems Paternal Uncle     No Known Problems Maternal Grandfather     No Known Problems Paternal Grandmother     No Known Problems Paternal Grandfather     ADD / ADHD Neg Hx     Anesthesia problems Neg Hx     Cancer Neg Hx     Clotting disorder Neg Hx     Collagen disease Neg Hx     Dislocations Neg Hx     Learning disabilities Neg Hx     Neurological problems Neg Hx     Osteoporosis Neg Hx     Rheumatologic disease Neg Hx     Scoliosis Neg Hx     Vascular Disease Neg Hx      I have reviewed and agree with the history as documented  E-Cigarette/Vaping    E-Cigarette Use Never User      E-Cigarette/Vaping Substances    Nicotine No     THC No     CBD No     Flavoring No     Other No     Unknown No      Social History     Tobacco Use    Smoking status: Former Smoker     Packs/day: 1 00     Years: 2 00     Pack years: 2 00     Quit date:      Years since quittin 6    Smokeless tobacco: Never Used   Vaping Use    Vaping Use: Never used   Substance Use Topics    Alcohol use: Yes     Comment: occais    Drug use: No       Review of Systems   Constitutional: Negative  HENT: Negative  Eyes: Negative  Respiratory: Negative  Cardiovascular: Negative  Gastrointestinal: Negative  Endocrine: Negative  Genitourinary: Negative  Musculoskeletal: Positive for arthralgias, back pain and myalgias  Skin: Negative      Allergic/Immunologic: Negative  Neurological: Negative  Hematological: Negative  Psychiatric/Behavioral: Negative  All other systems reviewed and are negative  Physical Exam  Physical Exam  Constitutional:       Appearance: Normal appearance  HENT:      Head: Normocephalic and atraumatic  Nose: Nose normal       Mouth/Throat:      Mouth: Mucous membranes are moist    Eyes:      Extraocular Movements: Extraocular movements intact  Pupils: Pupils are equal, round, and reactive to light  Cardiovascular:      Rate and Rhythm: Normal rate and regular rhythm  Pulmonary:      Effort: Pulmonary effort is normal       Breath sounds: Normal breath sounds  Abdominal:      General: Abdomen is flat  Bowel sounds are normal       Palpations: Abdomen is soft  Musculoskeletal:         General: Normal range of motion  Cervical back: Normal range of motion and neck supple  Comments: Right hip tenderness noted range of motion intact  Skin:     General: Skin is warm  Capillary Refill: Capillary refill takes less than 2 seconds  Neurological:      General: No focal deficit present  Mental Status: She is alert and oriented to person, place, and time  Mental status is at baseline  Psychiatric:         Mood and Affect: Mood normal          Thought Content:  Thought content normal          Vital Signs  ED Triage Vitals   Temperature Pulse Respirations Blood Pressure SpO2   08/16/22 1214 08/16/22 1214 08/16/22 1214 08/16/22 1214 08/16/22 1214   98 2 °F (36 8 °C) 69 18 112/69 99 %      Temp Source Heart Rate Source Patient Position - Orthostatic VS BP Location FiO2 (%)   08/16/22 1214 08/16/22 1214 08/16/22 1214 08/16/22 1214 --   Tympanic Monitor Lying Left arm       Pain Score       08/16/22 1310       8           Vitals:    08/16/22 1214   BP: 112/69   Pulse: 69   Patient Position - Orthostatic VS: Lying         Visual Acuity      ED Medications  Medications   acetaminophen (TYLENOL) tablet 975 mg (975 mg Oral Given 8/16/22 1310)       Diagnostic Studies  Results Reviewed     None                 XR hip/pelv 2-3 vws right if performed   Final Result by Sarah Camacho MD (08/16 1330)      No acute osseous abnormality  Workstation performed: [id number]         XR spine lumbar 2 or 3 views injury   Final Result by Sarah Camacho MD (08/16 1334)      No acute osseous abnormality  Degenerative changes as described  Workstation performed: [id number]         XR sacrum and coccyx   Final Result by Sarah Camacho MD (08/16 1334)      No acute osseous abnormality  Degenerative changes as described  Workstation performed: [id number]                    Procedures  Procedures         ED Course                               SBIRT 22yo+    Flowsheet Row Most Recent Value   SBIRT (25 yo +)    In order to provide better care to our patients, we are screening all of our patients for alcohol and drug use  Would it be okay to ask you these screening questions? No Filed at: 08/16/2022 1327                    MDM  Number of Diagnoses or Management Options     Amount and/or Complexity of Data Reviewed  Tests in the radiology section of CPT®: reviewed and ordered  Tests in the medicine section of CPT®: reviewed and ordered    Patient Progress  Patient progress: stable      Disposition  Final diagnoses:   Fall, initial encounter   Contusion of sacrum, initial encounter     Time reflects when diagnosis was documented in both MDM as applicable and the Disposition within this note     Time User Action Codes Description Comment    8/16/2022  1:48 PM Keila Glez [X44  XXXA] Fall, initial encounter     8/16/2022  1:48 PM Keila Glez Add [S30  0XXA] Contusion of sacrum, initial encounter       ED Disposition     ED Disposition   Discharge    Condition   Stable    Date/Time   Tue Aug 16, 2022  1:47 PM    Comment   Mickie Barcenas discharge to home/self care                 Follow-up Information Follow up With Specialties Details Why Contact Info Additional Information    Greg Puentes MD Family Medicine Schedule an appointment as soon as possible for a visit   [address] [facility] [id number] [address] [facility] Emergency Department Emergency Medicine  If symptoms worsen [address] [address] Emergency Department, [place], [place], [address]          Discharge Medication List as of 8/16/2022  1:49 PM      START taking these medications    Details   cyclobenzaprine (FLEXERIL) 10 mg tablet Take 1 tablet (10 mg total) by mouth 2 (two) times a day as needed for muscle spasms for up to 5 days, Starting Tue 8/16/2022, Until Sun 8/21/2022 at 2359, Normal      naproxen (NAPROSYN) 500 mg tablet Take 1 tablet (500 mg total) by mouth 2 (two) times a day with meals for 5 days, Starting Tue 8/16/2022, Until Sun 8/21/2022, Normal         CONTINUE these medications which have NOT CHANGED    Details   alclomethasone (ACLOVATE) 0 05 % cream Apply topically 2 (two) times a day, Starting Tue 6/21/2022, Normal      ALPRAZolam (XANAX) 0 25 mg tablet ONE TAB A DAY AS NEEDED, Historical Med      buPROPion (WELLBUTRIN XL) 300 mg 24 hr tablet Take 1 tablet (300 mg total) by mouth every morning, Starting Wed 12/16/2020, Normal      fexofenadine (ALLEGRA) 30 MG tablet Take 30 mg by mouth as needed  , Historical Med      metoprolol succinate (TOPROL-XL) 25 mg 24 hr tablet Take 1 tablet (25 mg total) by mouth daily at bedtime, Starting Wed 2/26/2020, Normal      montelukast (SINGULAIR) 10 mg tablet Take 1 tablet (10 mg total) by mouth daily at bedtime, Starting Tue 9/7/2021, Normal      olopatadine (PATANOL) 0 1 % ophthalmic solution Administer 1 drop to both eyes 2 (two) times a day, Starting Tue 9/7/2021, Normal             No discharge procedures on file      PDMP Review     None          ED Provider  Electronically Signed by           Morena Serrato DO  08/16/22 2002

## 2023-02-22 ENCOUNTER — OFFICE VISIT (OUTPATIENT)
Dept: FAMILY MEDICINE CLINIC | Facility: CLINIC | Age: 58
End: 2023-02-22

## 2023-02-22 VITALS
HEART RATE: 102 BPM | BODY MASS INDEX: 31.75 KG/M2 | DIASTOLIC BLOOD PRESSURE: 84 MMHG | RESPIRATION RATE: 16 BRPM | WEIGHT: 190.6 LBS | SYSTOLIC BLOOD PRESSURE: 128 MMHG | HEIGHT: 65 IN | TEMPERATURE: 97.6 F

## 2023-02-22 DIAGNOSIS — J40 BRONCHITIS: Primary | ICD-10-CM

## 2023-02-22 LAB
SARS-COV-2 AG UPPER RESP QL IA: NEGATIVE
VALID CONTROL: NORMAL

## 2023-02-22 RX ORDER — BUSPIRONE HYDROCHLORIDE 5 MG/1
5 TABLET ORAL
COMMUNITY
Start: 2023-02-09

## 2023-02-22 RX ORDER — BENZONATATE 200 MG/1
200 CAPSULE ORAL 3 TIMES DAILY PRN
Qty: 20 CAPSULE | Refills: 0 | Status: SHIPPED | OUTPATIENT
Start: 2023-02-22 | End: 2023-03-17

## 2023-02-22 RX ORDER — ALBUTEROL SULFATE 90 UG/1
2 AEROSOL, METERED RESPIRATORY (INHALATION) EVERY 6 HOURS PRN
Qty: 6.7 G | Refills: 1 | Status: SHIPPED | OUTPATIENT
Start: 2023-02-22 | End: 2023-03-17

## 2023-02-22 RX ORDER — AZITHROMYCIN 250 MG/1
TABLET, FILM COATED ORAL
Qty: 6 TABLET | Refills: 0 | Status: SHIPPED | OUTPATIENT
Start: 2023-02-22 | End: 2023-02-26

## 2023-02-22 NOTE — LETTER
February 22, 2023     Patient: Sarika Castillo  YOB: 1965  Date of Visit: 2/22/2023      To Whom it May Concern:    Sarika Castillo is under my professional care  Julisa Hernandez was seen in my office on 2/22/2023  Julisa Hernandez may return to work 2/26/2023  Please excuse work absences due to illness 2/22/2023 and 2/23/2023  If you have any questions or concerns, please don't hesitate to call           Sincerely,          Jeannie Calderon MD

## 2023-02-27 ENCOUNTER — TELEPHONE (OUTPATIENT)
Dept: FAMILY MEDICINE CLINIC | Facility: CLINIC | Age: 58
End: 2023-02-27

## 2023-02-27 DIAGNOSIS — J40 BRONCHITIS: Primary | ICD-10-CM

## 2023-02-27 RX ORDER — FLUTICASONE PROPIONATE AND SALMETEROL XINAFOATE 115; 21 UG/1; UG/1
2 AEROSOL, METERED RESPIRATORY (INHALATION) 2 TIMES DAILY
Qty: 12 G | Refills: 0 | Status: SHIPPED | OUTPATIENT
Start: 2023-02-27 | End: 2023-03-17

## 2023-02-27 RX ORDER — AMOXICILLIN AND CLAVULANATE POTASSIUM 875; 125 MG/1; MG/1
1 TABLET, FILM COATED ORAL EVERY 12 HOURS SCHEDULED
Qty: 14 TABLET | Refills: 0 | Status: SHIPPED | OUTPATIENT
Start: 2023-02-27 | End: 2023-03-06

## 2023-02-27 NOTE — TELEPHONE ENCOUNTER
Patient seen by Dr Ian Ocampo 2/22  Shew finished zpac yesterday, still has cough with yellow phleghm  Please advise

## 2023-02-27 NOTE — TELEPHONE ENCOUNTER
Spoke w pt --sent in addtl abx and inhaler and ordered chest x-ray    Please add to my schedule on Wednesday at 2pm for recheck--thanks

## 2023-02-28 ENCOUNTER — APPOINTMENT (OUTPATIENT)
Dept: RADIOLOGY | Facility: CLINIC | Age: 58
End: 2023-02-28

## 2023-02-28 DIAGNOSIS — J40 BRONCHITIS: ICD-10-CM

## 2023-03-01 ENCOUNTER — OFFICE VISIT (OUTPATIENT)
Dept: FAMILY MEDICINE CLINIC | Facility: CLINIC | Age: 58
End: 2023-03-01

## 2023-03-01 VITALS
DIASTOLIC BLOOD PRESSURE: 72 MMHG | TEMPERATURE: 97.2 F | RESPIRATION RATE: 16 BRPM | BODY MASS INDEX: 31.96 KG/M2 | OXYGEN SATURATION: 98 % | SYSTOLIC BLOOD PRESSURE: 102 MMHG | HEART RATE: 78 BPM | WEIGHT: 191.8 LBS | HEIGHT: 65 IN

## 2023-03-01 DIAGNOSIS — J40 BRONCHITIS: Primary | ICD-10-CM

## 2023-03-01 NOTE — LETTER
March 1, 2023     Patient: Makayla Brannon  YOB: 1965  Date of Visit: 3/1/2023      To Whom it May Concern:    Makayla Brannon is under my professional care  Patience Fresh was seen in my office for acute non-covid illness  Please excuse work absences 2/22/2023 thru 3/2/2023-  Patient may return to work on 3/2/2023  If you have any questions or concerns, please don't hesitate to call           Sincerely,          Leonid Coon MD

## 2023-03-10 ENCOUNTER — RA CDI HCC (OUTPATIENT)
Dept: OTHER | Facility: HOSPITAL | Age: 58
End: 2023-03-10

## 2023-03-10 NOTE — PROGRESS NOTES
[facility]  coding opportunities       Chart reviewed, no opportunity found: CHART REVIEWED, NO OPPORTUNITY FOUND        Patients Insurance        Commercial Insurance: Danial Bennett

## 2023-03-17 ENCOUNTER — OFFICE VISIT (OUTPATIENT)
Dept: FAMILY MEDICINE CLINIC | Facility: CLINIC | Age: 58
End: 2023-03-17

## 2023-03-17 VITALS
HEART RATE: 76 BPM | TEMPERATURE: 97.7 F | WEIGHT: 190.4 LBS | RESPIRATION RATE: 18 BRPM | DIASTOLIC BLOOD PRESSURE: 82 MMHG | SYSTOLIC BLOOD PRESSURE: 122 MMHG | BODY MASS INDEX: 31.72 KG/M2 | HEIGHT: 65 IN

## 2023-03-17 DIAGNOSIS — E78.5 HYPERLIPIDEMIA, UNSPECIFIED HYPERLIPIDEMIA TYPE: ICD-10-CM

## 2023-03-17 DIAGNOSIS — I10 BENIGN HYPERTENSION: Primary | ICD-10-CM

## 2023-03-17 PROBLEM — J40 BRONCHITIS: Status: RESOLVED | Noted: 2023-02-22 | Resolved: 2023-03-17

## 2023-03-17 RX ORDER — ATENOLOL AND CHLORTHALIDONE TABLET 50; 25 MG/1; MG/1
0.5 TABLET ORAL DAILY
Qty: 45 TABLET | Refills: 3 | Status: SHIPPED | OUTPATIENT
Start: 2023-03-17

## 2023-03-17 NOTE — PROGRESS NOTES
Chief Complaint   Patient presents with   • Hypertension     BP check         Patient ID: Robin Johnson is a 59 y o  female  HPI  Pt is seeing for f/u HTN and HLD - admits not following strict diet -  BP is home in 130s/80-90s     The following portions of the patient's history were reviewed and updated as appropriate: allergies, current medications, past family history, past medical history, past social history, past surgical history and problem list     Review of Systems   Constitutional: Negative  Respiratory: Negative  Cardiovascular: Negative  Gastrointestinal: Negative  Genitourinary: Negative  Musculoskeletal: Negative  Skin: Negative  Neurological: Negative  Current Outpatient Medications   Medication Sig Dispense Refill   • ALPRAZolam (XANAX) 0 25 mg tablet ONE TAB A DAY AS NEEDED     • buPROPion (WELLBUTRIN XL) 300 mg 24 hr tablet Take 1 tablet (300 mg total) by mouth every morning 90 tablet 3   • busPIRone (BUSPAR) 5 mg tablet Take 5 mg by mouth daily at bedtime     • metoprolol succinate (TOPROL-XL) 25 mg 24 hr tablet Take 1 tablet (25 mg total) by mouth daily at bedtime 90 tablet 3     No current facility-administered medications for this visit  Objective:    /82 (BP Location: Left arm, Patient Position: Sitting, Cuff Size: Large)   Pulse 76   Temp 97 7 °F (36 5 °C)   Resp 18   Ht 5' 5" (1 651 m)   Wt 86 4 kg (190 lb 6 4 oz)   BMI 31 68 kg/m²        Physical Exam  Constitutional:       General: She is not in acute distress  Appearance: She is obese  She is not ill-appearing  Cardiovascular:      Rate and Rhythm: Normal rate and regular rhythm  Heart sounds: No murmur heard  Pulmonary:      Effort: Pulmonary effort is normal  No respiratory distress  Breath sounds: No wheezing, rhonchi or rales  Musculoskeletal:      Right lower leg: No edema  Left lower leg: No edema  Neurological:      General: No focal deficit present  Mental Status: She is alert and oriented to person, place, and time  Cranial Nerves: No cranial nerve deficit  Psychiatric:         Mood and Affect: Mood normal                  Assessment/Plan:         Diagnoses and all orders for this visit:    Benign hypertension  -     Comprehensive metabolic panel; Future  -   Will change Metoprolol to   atenolol-chlorthalidone (TENORETIC) 50-25 mg per tablet; Take 0 5 tablets by mouth daily    Hyperlipidemia, unspecified hyperlipidemia type  -     Lipid panel; Future  -     Comprehensive metabolic panel;  Future        rto in 3 m                 Kris Spears MD

## 2023-03-23 NOTE — PROGRESS NOTES
Assessment/Plan:    1  Bronchitis  -     azithromycin (ZITHROMAX) 250 mg tablet; Take 2 tablets today then 1 tablet daily x 4 days  -     POCT Rapid Covid Ag  rx albuterol inhaler  rx Tessalon perles     Subjective:      Patient ID: Sarika Castillo is a 59 y o  female  Chief Complaint   Patient presents with   • Cough     Hoarse throat , no appitite , feeling very weak and congested - 2 negative covid test yesterday        Cough  This is a new problem  The current episode started in the past 7 days  The problem has been gradually worsening  Associated symptoms include a fever, nasal congestion, postnasal drip, rhinorrhea, a sore throat and wheezing  Pertinent negatives include no hemoptysis, rash or shortness of breath  The symptoms are aggravated by cold air  Her past medical history is significant for bronchitis and environmental allergies  weakness, decreased appetite  COVID 19 tests-neg x 2 yesterday at home, neg rapid in office today    The following portions of the patient's history were reviewed and updated as appropriate: allergies, current medications, past family history, past medical history, past social history, past surgical history and problem list     Review of Systems   Constitutional: Positive for fever  HENT: Positive for postnasal drip, rhinorrhea and sore throat  Respiratory: Positive for cough and wheezing  Negative for hemoptysis and shortness of breath  Skin: Negative for rash  Allergic/Immunologic: Positive for environmental allergies           Current Outpatient Medications   Medication Sig Dispense Refill   • ALPRAZolam (XANAX) 0 25 mg tablet ONE TAB A DAY AS NEEDED     • buPROPion (WELLBUTRIN XL) 300 mg 24 hr tablet Take 1 tablet (300 mg total) by mouth every morning 90 tablet 3   • busPIRone (BUSPAR) 5 mg tablet Take 5 mg by mouth daily at bedtime     • atenolol-chlorthalidone (TENORETIC) 50-25 mg per tablet Take 0 5 tablets by mouth daily 45 tablet 3     No current facility-administered medications for this visit  Objective:    /84 (BP Location: Left arm, Patient Position: Sitting, Cuff Size: Large)   Pulse 102   Temp 97 6 °F (36 4 °C)   Resp 16   Ht 5' 5" (1 651 m)   Wt 86 5 kg (190 lb 9 6 oz)   BMI 31 72 kg/m²        Physical Exam  Vitals and nursing note reviewed  Constitutional:       General: She is not in acute distress  Comments: Looks tired   HENT:      Nose: Congestion present  Mouth/Throat:      Pharynx: Posterior oropharyngeal erythema present  No oropharyngeal exudate  Cardiovascular:      Rate and Rhythm: Normal rate and regular rhythm  Pulmonary:      Effort: No respiratory distress  Breath sounds: Wheezing present  Skin:     Findings: No rash  Neurological:      General: No focal deficit present  Mental Status: She is alert and oriented to person, place, and time             Soham Young MD

## 2023-03-31 NOTE — ASSESSMENT & PLAN NOTE
sxs improving w abx/flonase/inh use  Completed Zpk and then given Augmentin on 2/27 due to persistent sxs  Chest x-ray negative on 2/28

## 2023-03-31 NOTE — PROGRESS NOTES
"Assessment/Plan:    1  Bronchitis  Assessment & Plan:  sxs improving w abx/flonase/inh use  Completed Zpk and then given Augmentin on 2/27 due to persistent sxs  Chest x-ray negative on 2/28        pt given medical excuse for work days missed w rtw for 3/2/2023            Subjective:      Patient ID: Fabiano Colin is a 59 y o  female  Chief Complaint   Patient presents with   • Follow-up     Xray results , pt said congestion is getting a little better tc/cma       HPI  Follow-up  sxs improving  Chest x-ray negative  Tolerating Augmentin that was started after sxs persisted following completion of Zpk  No new c/o    The following portions of the patient's history were reviewed and updated as appropriate: allergies, current medications, past family history, past medical history, past social history, past surgical history and problem list     Review of Systems   Constitutional: Negative for fever  HENT: Positive for postnasal drip, rhinorrhea and sore throat  Respiratory: Positive for cough (decreased) and wheezing (occasional)  Negative for shortness of breath  Skin: Negative for rash  Allergic/Immunologic: Positive for environmental allergies  Current Outpatient Medications   Medication Sig Dispense Refill   • ALPRAZolam (XANAX) 0 25 mg tablet ONE TAB A DAY AS NEEDED     • buPROPion (WELLBUTRIN XL) 300 mg 24 hr tablet Take 1 tablet (300 mg total) by mouth every morning 90 tablet 3   • busPIRone (BUSPAR) 5 mg tablet Take 5 mg by mouth daily at bedtime     • atenolol-chlorthalidone (TENORETIC) 50-25 mg per tablet Take 0 5 tablets by mouth daily 45 tablet 3     No current facility-administered medications for this visit         Objective:    /72 (BP Location: Left arm, Patient Position: Sitting, Cuff Size: Large)   Pulse 78   Temp (!) 97 2 °F (36 2 °C)   Resp 16   Ht 5' 5\" (1 651 m)   Wt 87 kg (191 lb 12 8 oz)   SpO2 98% Comment: RA  BMI 31 92 kg/m²        Physical Exam  Vitals and " nursing note reviewed  Constitutional:       General: She is not in acute distress  Comments: Looks tired   HENT:      Nose: Congestion present  Mouth/Throat:      Pharynx: No oropharyngeal exudate  Cardiovascular:      Rate and Rhythm: Normal rate and regular rhythm  Pulmonary:      Effort: Pulmonary effort is normal  No respiratory distress  Comments: occ faint exp wheeze--no localization  Skin:     Findings: No rash  Neurological:      General: No focal deficit present  Mental Status: She is alert and oriented to person, place, and time           Rashaad Roberts MD

## 2023-05-07 ENCOUNTER — APPOINTMENT (EMERGENCY)
Dept: RADIOLOGY | Facility: HOSPITAL | Age: 58
End: 2023-05-07

## 2023-05-07 ENCOUNTER — HOSPITAL ENCOUNTER (EMERGENCY)
Facility: HOSPITAL | Age: 58
Discharge: HOME | End: 2023-05-08
Attending: EMERGENCY MEDICINE

## 2023-05-07 VITALS
TEMPERATURE: 97.7 F | SYSTOLIC BLOOD PRESSURE: 99 MMHG | OXYGEN SATURATION: 100 % | DIASTOLIC BLOOD PRESSURE: 55 MMHG | HEART RATE: 82 BPM | RESPIRATION RATE: 19 BRPM

## 2023-05-07 DIAGNOSIS — G43.009 ATYPICAL MIGRAINE: Primary | ICD-10-CM

## 2023-05-07 LAB
ALBUMIN SERPL BCP-MCNC: 3.4 G/DL (ref 3.5–5)
ALP SERPL-CCNC: 59 U/L (ref 34–104)
ALT SERPL W P-5'-P-CCNC: 11 U/L (ref 7–52)
AMMONIA PLAS-SCNC: 27 UMOL/L (ref 18–72)
AMPHETAMINES SERPL QL SCN: NEGATIVE
ANION GAP SERPL CALCULATED.3IONS-SCNC: 8 MMOL/L (ref 4–13)
APAP SERPL-MCNC: <10 UG/ML (ref 10–20)
APTT PPP: 26 SECONDS (ref 23–37)
AST SERPL W P-5'-P-CCNC: 13 U/L (ref 13–39)
BACTERIA UR QL AUTO: ABNORMAL /HPF
BARBITURATES UR QL: NEGATIVE
BASOPHILS # BLD AUTO: 0.05 THOUSANDS/ÂΜL (ref 0–0.1)
BASOPHILS NFR BLD AUTO: 1 % (ref 0–1)
BENZODIAZ UR QL: NEGATIVE
BILIRUB SERPL-MCNC: 0.33 MG/DL (ref 0.2–1)
BILIRUB UR QL STRIP: NEGATIVE
BUN SERPL-MCNC: 15 MG/DL (ref 5–25)
CALCIUM ALBUM COR SERPL-MCNC: 8.4 MG/DL (ref 8.3–10.1)
CALCIUM SERPL-MCNC: 7.9 MG/DL (ref 8.4–10.2)
CARDIAC TROPONIN I PNL SERPL HS: <2 NG/L
CARDIAC TROPONIN I PNL SERPL HS: <2 NG/L
CHLORIDE SERPL-SCNC: 103 MMOL/L (ref 96–108)
CLARITY UR: CLEAR
CO2 SERPL-SCNC: 26 MMOL/L (ref 21–32)
COCAINE UR QL: NEGATIVE
COLOR UR: ABNORMAL
CREAT SERPL-MCNC: 0.98 MG/DL (ref 0.6–1.3)
EOSINOPHIL # BLD AUTO: 0.12 THOUSAND/ÂΜL (ref 0–0.61)
EOSINOPHIL NFR BLD AUTO: 2 % (ref 0–6)
ERYTHROCYTE [DISTWIDTH] IN BLOOD BY AUTOMATED COUNT: 13.6 % (ref 11.6–15.1)
ETHANOL SERPL-MCNC: <10 MG/DL
GFR SERPL CREATININE-BSD FRML MDRD: 63 ML/MIN/1.73SQ M
GLUCOSE SERPL-MCNC: 137 MG/DL (ref 65–140)
GLUCOSE SERPL-MCNC: 182 MG/DL (ref 65–140)
GLUCOSE UR STRIP-MCNC: NEGATIVE MG/DL
HCT VFR BLD AUTO: 39.8 % (ref 34.8–46.1)
HGB BLD-MCNC: 13.3 G/DL (ref 11.5–15.4)
HGB UR QL STRIP.AUTO: ABNORMAL
IMM GRANULOCYTES # BLD AUTO: 0.01 THOUSAND/UL (ref 0–0.2)
IMM GRANULOCYTES NFR BLD AUTO: 0 % (ref 0–2)
INR PPP: 1.06 (ref 0.84–1.19)
KETONES UR STRIP-MCNC: NEGATIVE MG/DL
LEUKOCYTE ESTERASE UR QL STRIP: ABNORMAL
LYMPHOCYTES # BLD AUTO: 1.68 THOUSANDS/ÂΜL (ref 0.6–4.47)
LYMPHOCYTES NFR BLD AUTO: 28 % (ref 14–44)
MCH RBC QN AUTO: 29.5 PG (ref 26.8–34.3)
MCHC RBC AUTO-ENTMCNC: 33.4 G/DL (ref 31.4–37.4)
MCV RBC AUTO: 88 FL (ref 82–98)
METHADONE UR QL: NEGATIVE
MONOCYTES # BLD AUTO: 0.46 THOUSAND/ÂΜL (ref 0.17–1.22)
MONOCYTES NFR BLD AUTO: 8 % (ref 4–12)
NEUTROPHILS # BLD AUTO: 3.6 THOUSANDS/ÂΜL (ref 1.85–7.62)
NEUTS SEG NFR BLD AUTO: 61 % (ref 43–75)
NITRITE UR QL STRIP: NEGATIVE
NON-SQ EPI CELLS URNS QL MICRO: ABNORMAL /HPF
NRBC BLD AUTO-RTO: 0 /100 WBCS
OPIATES UR QL SCN: NEGATIVE
OXYCODONE+OXYMORPHONE UR QL SCN: NEGATIVE
PCP UR QL: NEGATIVE
PH UR STRIP.AUTO: 6 [PH]
PLATELET # BLD AUTO: 226 THOUSANDS/UL (ref 149–390)
PMV BLD AUTO: 9.8 FL (ref 8.9–12.7)
POTASSIUM SERPL-SCNC: 3 MMOL/L (ref 3.5–5.3)
PROT SERPL-MCNC: 5.3 G/DL (ref 6.4–8.4)
PROT UR STRIP-MCNC: NEGATIVE MG/DL
PROTHROMBIN TIME: 13.9 SECONDS (ref 11.6–14.5)
RBC # BLD AUTO: 4.51 MILLION/UL (ref 3.81–5.12)
RBC #/AREA URNS AUTO: ABNORMAL /HPF
SALICYLATES SERPL-MCNC: <5 MG/DL (ref 3–20)
SODIUM SERPL-SCNC: 137 MMOL/L (ref 135–147)
SP GR UR STRIP.AUTO: 1.01 (ref 1–1.03)
THC UR QL: NEGATIVE
UROBILINOGEN UR QL STRIP.AUTO: 0.2 E.U./DL
WBC # BLD AUTO: 5.92 THOUSAND/UL (ref 4.31–10.16)
WBC #/AREA URNS AUTO: ABNORMAL /HPF

## 2023-05-07 RX ORDER — DEXAMETHASONE SODIUM PHOSPHATE 10 MG/ML
10 INJECTION, SOLUTION INTRAMUSCULAR; INTRAVENOUS ONCE
Status: DISCONTINUED | OUTPATIENT
Start: 2023-05-07 | End: 2023-05-07

## 2023-05-07 RX ORDER — METOCLOPRAMIDE HYDROCHLORIDE 5 MG/ML
10 INJECTION INTRAMUSCULAR; INTRAVENOUS ONCE
Status: COMPLETED | OUTPATIENT
Start: 2023-05-07 | End: 2023-05-07

## 2023-05-07 RX ORDER — DEXAMETHASONE SODIUM PHOSPHATE 4 MG/ML
10 INJECTION, SOLUTION INTRA-ARTICULAR; INTRALESIONAL; INTRAMUSCULAR; INTRAVENOUS; SOFT TISSUE ONCE
Status: COMPLETED | OUTPATIENT
Start: 2023-05-07 | End: 2023-05-07

## 2023-05-07 RX ADMIN — DEXAMETHASONE SODIUM PHOSPHATE 10 MG: 4 INJECTION, SOLUTION INTRAMUSCULAR; INTRAVENOUS at 23:12

## 2023-05-07 RX ADMIN — SODIUM CHLORIDE 1000 ML: 0.9 INJECTION, SOLUTION INTRAVENOUS at 20:49

## 2023-05-07 RX ADMIN — IOHEXOL 100 ML: 350 INJECTION, SOLUTION INTRAVENOUS at 20:22

## 2023-05-07 RX ADMIN — METOCLOPRAMIDE 10 MG: 5 INJECTION, SOLUTION INTRAMUSCULAR; INTRAVENOUS at 22:18

## 2023-05-07 NOTE — Clinical Note
Roselia Rubi was seen and treated in our emergency department on 5/7/2023  Diagnosis:     Vonnie Jacques  may return to work on return date  She may return on this date: 05/09/2023         If you have any questions or concerns, please don't hesitate to call        Marley Contreras DO    ______________________________           _______________          _______________  Hospital Representative                              Date                                Time

## 2023-05-08 LAB
ATRIAL RATE: 100 BPM
P AXIS: 58 DEGREES
PR INTERVAL: 126 MS
QRS AXIS: 70 DEGREES
QRSD INTERVAL: 78 MS
QT INTERVAL: 332 MS
QTC INTERVAL: 428 MS
T WAVE AXIS: 88 DEGREES
VENTRICULAR RATE: 100 BPM

## 2023-05-08 NOTE — DISCHARGE INSTRUCTIONS
Return to the ER for further concerns or worsening symptoms  Follow up with your primary care physician in 1-2 days\

## 2023-05-08 NOTE — QUICK NOTE
Called by  regarding stroke alert at 8 PM, neuro response was immediate  62year old female presenting with headache and lightheadedness and dysarthria  - NIHSS 1  - LKW 7 PM      CTH & CTA were unremarkable for any acute pathology, LVO, or other IR target  IV thrombolysis was given due to low NIH stroke scale score/nondisabling symptoms/low clinical suspicion for acute vascular event  Recommend migraine treatment   -Sitter migraine cocktail   - No clear need for stroke work-up at this time

## 2023-05-08 NOTE — ED PROVIDER NOTES
History  Chief Complaint   Patient presents with   • CVA/TIA-like Symptoms     Pt arrives from home with dizziness starting 40 minutes PTA while at work, hx of BPPV 10 years ago  EMS report patient slurring words worsening since arrival       Patient is a 64-year-old female that presents with EMS from [facility], where she is employed for evaluation for possible stroke  Patient with complaint of dizziness that began approximately 40 minutes prior to arrival   Patient with slurred speech that worsened in route to the ED  She also complains of a headache  Patient states that she has a remote history of BPPV, and this seems similar  Patient also admits that she took a dose of Xanax at approximately 3 PM   Patient is scheduled to work from 6 AM to 11 PM today  She states that this is not unusual for her  She has a history of hypertension, hyperlipidemia, depression, orthostatic hypotension, anxiety  Stroke alert initiated during my initial evaluation  History provided by:  Patient  History limited by:  Acuity of condition   used: No    CVA/TIA-like Symptoms  Presenting symptoms: confusion, language symptoms and weakness    Onset quality:  Sudden  Timing:  Constant  Progression:  Worsening  Similar to previous episodes: no    Associated symptoms: dizziness    Associated symptoms: no fever, no nausea, no neck pain and no vomiting        Prior to Admission Medications   Prescriptions Last Dose Informant Patient Reported? Taking?    ALPRAZolam (XANAX) 0 25 mg tablet   Yes No   Sig: ONE TAB A DAY AS NEEDED   atenolol-chlorthalidone (TENORETIC) 50-25 mg per tablet   No No   Sig: Take 0 5 tablets by mouth daily   buPROPion (WELLBUTRIN XL) 300 mg 24 hr tablet   No No   Sig: Take 1 tablet (300 mg total) by mouth every morning   busPIRone (BUSPAR) 5 mg tablet   Yes No   Sig: Take 5 mg by mouth daily at bedtime      Facility-Administered Medications: None       Past Medical History:   Diagnosis Date   • Depression    • Hyperlipidemia    • Hypertension    • Orthostatic hypotension    • Other specified anxiety disorders    • PONV (postoperative nausea and vomiting)    • Syncope     last episode 2018       Past Surgical History:   Procedure Laterality Date   • CATARACT EXTRACTION Bilateral    • COLONOSCOPY     • DILATION AND EVACUATION     • ENDOMETRIAL BIOPSY  2017   • [place] COLONOSCOPY FLX DX W/COLLJ SPEC WHEN PFRMD N/A 2018    Procedure: COLONOSCOPY;  Surgeon: Faustino Crowe MD;  Location: [facility]; Service: Gastroenterology       Family History   Problem Relation Age of Onset   • Diabetes Mother    • Hypertension Mother    • Hyperlipidemia Mother    • Hypertension Father    • Hyperlipidemia Sister    • Hypertension Sister    • Diabetes Maternal Grandmother    • No Known Problems Brother    • No Known Problems Maternal Aunt    • No Known Problems Maternal Uncle    • No Known Problems Paternal Aunt    • No Known Problems Paternal Uncle    • No Known Problems Maternal Grandfather    • No Known Problems Paternal Grandmother    • No Known Problems Paternal Grandfather    • ADD / ADHD Neg Hx    • Anesthesia problems Neg Hx    • Cancer Neg Hx    • Clotting disorder Neg Hx    • Collagen disease Neg Hx    • Dislocations Neg Hx    • Learning disabilities Neg Hx    • Neurological problems Neg Hx    • Osteoporosis Neg Hx    • Rheumatologic disease Neg Hx    • Scoliosis Neg Hx    • Vascular Disease Neg Hx      I have reviewed and agree with the history as documented      E-Cigarette/Vaping   • E-Cigarette Use Never User      E-Cigarette/Vaping Substances   • Nicotine No    • THC No    • CBD No    • Flavoring No    • Other No    • Unknown No      Social History     Tobacco Use   • Smoking status: Former     Packs/day: 1 00     Years: 2 00     Pack years: 2 00     Types: Cigarettes     Start date: 3/14/2013     Quit date:      Years since quittin 3   • Smokeless tobacco: Never   Vaping Use   • Vaping Use: Never used   Substance Use Topics   • Alcohol use: Yes     Comment: occais   • Drug use: No       Review of Systems   Constitutional: Negative for chills and fever  Respiratory: Negative for cough, chest tightness and shortness of breath  Gastrointestinal: Negative for abdominal pain, diarrhea, nausea and vomiting  Genitourinary: Negative for dysuria, frequency, hematuria and urgency  Musculoskeletal: Negative for back pain, neck pain and neck stiffness  Neurological: Positive for dizziness, speech difficulty and weakness  Negative for facial asymmetry  Psychiatric/Behavioral: Positive for confusion  All other systems reviewed and are negative  Physical Exam  Physical Exam  Vitals and nursing note reviewed  Constitutional:       General: She is not in acute distress  Appearance: She is well-developed  She is not diaphoretic  HENT:      Head: Normocephalic and atraumatic  Eyes:      Extraocular Movements: Extraocular movements intact  Conjunctiva/sclera: Conjunctivae normal       Pupils: Pupils are equal, round, and reactive to light  Cardiovascular:      Rate and Rhythm: Normal rate and regular rhythm  Heart sounds: Normal heart sounds  No murmur heard  Pulmonary:      Effort: Pulmonary effort is normal  No respiratory distress  Breath sounds: Normal breath sounds  Abdominal:      General: Bowel sounds are normal  There is no distension  Palpations: Abdomen is soft  Tenderness: There is no abdominal tenderness  Musculoskeletal:         General: No deformity  Normal range of motion  Cervical back: Normal range of motion and neck supple  Skin:     General: Skin is warm and dry  Capillary Refill: Capillary refill takes less than 2 seconds  Coloration: Skin is not pale  Findings: No rash  Neurological:      General: No focal deficit present        Mental Status: She is alert and oriented to person, place, and time  Cranial Nerves: No cranial nerve deficit  Sensory: No sensory deficit  Coordination: Coordination abnormal    Psychiatric:         Behavior: Behavior normal          Vital Signs  ED Triage Vitals   Temperature Pulse Respirations Blood Pressure SpO2   05/07/23 2004 05/07/23 2004 05/07/23 2004 05/07/23 2004 05/07/23 2004   97 7 °F (36 5 °C) (!) 107 22 108/71 93 %      Temp Source Heart Rate Source Patient Position - Orthostatic VS BP Location FiO2 (%)   05/07/23 2004 05/07/23 2004 05/07/23 2004 05/07/23 2200 --   Oral Monitor Lying Right arm       Pain Score       --                  Vitals:    05/07/23 2230 05/07/23 2245 05/07/23 2300 05/07/23 2315   BP: 99/54 118/56 105/56 99/55   Pulse: 84 95 87 82   Patient Position - Orthostatic VS: Lying Lying  Lying         Visual Acuity  Visual Acuity    Flowsheet Row Most Recent Value   L Pupil Size (mm) 2   R Pupil Size (mm) 2          ED Medications  Medications   iohexol (OMNIPAQUE) 350 MG/ML injection (SINGLE-DOSE) 100 mL (100 mL Intravenous Given 5/7/23 2022)   metoclopramide (REGLAN) injection 10 mg (10 mg Intravenous Given 5/7/23 2218)   sodium chloride 0 9 % bolus 1,000 mL (0 mL Intravenous Stopped 5/7/23 2149)   dexamethasone (DECADRON) injection 10 mg (10 mg Intravenous Given 5/7/23 2312)       Diagnostic Studies  Results Reviewed     Procedure Component Value Units Date/Time    Urine culture [[id number]]     Lab Status: No result Specimen: Urine     Rapid drug screen, urine [[id number]]  (Normal) Collected: 05/07/23 2245    Lab Status: Final result Specimen: Urine, Other Updated: 05/07/23 2310     Amph/Meth UR Negative     Barbiturate Ur Negative     Benzodiazepine Urine Negative     Cocaine Urine Negative     Methadone Urine Negative     Opiate Urine Negative     PCP Ur Negative     THC Urine Negative     Oxycodone Urine Negative    Narrative:      FOR MEDICAL PURPOSES ONLY     IF CONFIRMATION NEEDED PLEASE CONTACT THE LAB WITHIN 5 DAYS     Drug Screen Cutoff Levels:  AMPHETAMINE/METHAMPHETAMINES  1000 ng/mL  BARBITURATES     200 ng/mL  BENZODIAZEPINES     200 ng/mL  COCAINE      300 ng/mL  METHADONE      300 ng/mL  OPIATES      300 ng/mL  PHENCYCLIDINE     25 ng/mL  THC       50 ng/mL  OXYCODONE      100 ng/mL    Urine Microscopic [[id number]]  (Abnormal) Collected: 05/07/23 2245    Lab Status: Final result Specimen: Urine, Other Updated: 05/07/23 2305     RBC, UA 0-5 /hpf      WBC, UA 30-50 /hpf      Epithelial Cells Moderate /hpf      Bacteria, UA Occasional /hpf     HS Troponin I 2hr [[id number]] Collected: 05/07/23 2217    Lab Status: Final result Specimen: Blood from Arm, Left Updated: 05/07/23 2254     hs TnI 2hr <2 ng/L      Delta 2hr hsTnI --    UA (URINE) with reflex to Scope [[id number]]  (Abnormal) Collected: 05/07/23 2245    Lab Status: Final result Specimen: Urine, Other Updated: 05/07/23 2251     Color, UA Light Yellow     Clarity, UA Clear     Specific Gravity, UA 1 010     pH, UA 6 0     Leukocytes, UA Moderate     Nitrite, UA Negative     Protein, UA Negative mg/dl      Glucose, UA Negative mg/dl      Ketones, UA Negative mg/dl      Urobilinogen, UA 0 2 E U /dl      Bilirubin, UA Negative     Occult Blood, UA Trace-Intact    Salicylate level [[id number]]  (Normal) Collected: 05/07/23 2022    Lab Status: Final result Specimen: Blood from Arm, Left Updated: 72/08/37 3897     Salicylate Lvl <5 mg/dL     Comprehensive metabolic panel [[id number]]  (Abnormal) Collected: 05/07/23 2022    Lab Status: Final result Specimen: Blood from Arm, Left Updated: 05/07/23 2052     Sodium 137 mmol/L      Potassium 3 0 mmol/L      Chloride 103 mmol/L      CO2 26 mmol/L      ANION GAP 8 mmol/L      BUN 15 mg/dL      Creatinine 0 98 mg/dL      Glucose 137 mg/dL      Calcium 7 9 mg/dL      Corrected Calcium 8 4 mg/dL      AST 13 U/L      ALT 11 U/L      Alkaline Phosphatase 59 U/L      Total Protein 5 3 g/dL      Albumin 3 4 g/dL      Total Bilirubin 0 33 mg/dL      eGFR 63 ml/min/1 73sq m     Narrative:      [place] guidelines for Chronic Kidney Disease (CKD):   •  Stage 1 with normal or high GFR (GFR > 90 mL/min/1 73 square meters)  •  Stage 2 Mild CKD (GFR = 60-89 mL/min/1 73 square meters)  •  Stage 3A Moderate CKD (GFR = 45-59 mL/min/1 73 square meters)  •  Stage 3B Moderate CKD (GFR = 30-44 mL/min/1 73 square meters)  •  Stage 4 Severe CKD (GFR = 15-29 mL/min/1 73 square meters)  •  Stage 5 End Stage CKD (GFR <15 mL/min/1 73 square meters)  Note: GFR calculation is accurate only with a steady state creatinine    Acetaminophen level-If concentration is detectable, please discuss with medical  on call   [[id number]]  (Abnormal) Collected: 05/07/23 2022    Lab Status: Final result Specimen: Blood from Arm, Left Updated: 05/07/23 2052     Acetaminophen Level <10 ug/mL     Ammonia [[id number]]  (Normal) Collected: 05/07/23 2022    Lab Status: Final result Specimen: Blood from Arm, Left Updated: 05/07/23 2046     Ammonia 27 umol/L     HS Troponin 0hr (reflex protocol) [[id number]]  (Normal) Collected: 05/07/23 2005    Lab Status: Final result Specimen: Blood from Arm, Left Updated: 05/07/23 2034     hs TnI 0hr <2 ng/L     Ethanol [[id number]]  (Normal) Collected: 05/07/23 2005    Lab Status: Final result Specimen: Blood from Arm, Left Updated: 05/07/23 2025     Ethanol Lvl <10 mg/dL     Protime-INR [[id number]]  (Normal) Collected: 05/07/23 2005    Lab Status: Final result Specimen: Blood from Arm, Left Updated: 05/07/23 2022     Protime 13 9 seconds      INR 1 06    APTT [[id number]]  (Normal) Collected: 05/07/23 2005    Lab Status: Final result Specimen: Blood from Arm, Left Updated: 05/07/23 2022     PTT 26 seconds     CBC and differential [[id number]] Collected: 05/07/23 2005    Lab Status: Final result Specimen: Blood from Arm, Left Updated: 05/07/23 2010     WBC 5 92 Thousand/uL      RBC 4 51 Million/uL      Hemoglobin 13 3 g/dL      Hematocrit 39 8 %      MCV 88 fL      MCH 29 5 pg      MCHC 33 4 g/dL      RDW 13 6 %      MPV 9 8 fL      Platelets 101 Thousands/uL      nRBC 0 /100 WBCs      Neutrophils Relative 61 %      Immat GRANS % 0 %      Lymphocytes Relative 28 %      Monocytes Relative 8 %      Eosinophils Relative 2 %      Basophils Relative 1 %      Neutrophils Absolute 3 60 Thousands/µL      Immature Grans Absolute 0 01 Thousand/uL      Lymphocytes Absolute 1 68 Thousands/µL      Monocytes Absolute 0 46 Thousand/µL      Eosinophils Absolute 0 12 Thousand/µL      Basophils Absolute 0 05 Thousands/µL     Fingerstick Glucose (POCT) [[id number]]  (Abnormal) Collected: 05/07/23 2001    Lab Status: Final result Updated: 05/07/23 2002     POC Glucose 182 mg/dl                  CTA stroke alert (head/neck)   Final Result by Francisca Salazar MD (05/07 2314)   Addendum (preliminary) 1 of 1 by Francisca Salazar MD (05/07 2314)   ADDENDUM:      This left maxillary sinus findings represent bone graft material from    implant placement, this does NOT require a follow-up  Final      1  No acute intracranial or angiographic abnormality   2  Left maxillary sinus lesion, recommend nonemergent MRI face/sinus with and without contrast         I personally discussed impression 1 with Kristen Nazario  at 5/7/23 9:18 PM            Workstation performed: [id number]         CT stroke alert brain   Final Result by Francisca Salazar MD (05/07 2314)   Addendum (preliminary) 1 of 1 by Francisca Salazar MD (05/07 2314)   ADDENDUM:      This left maxillary sinus findings represent bone graft material from    implant placement, this does NOT require a follow-up  Final      1  No acute intracranial or angiographic abnormality   2    Left maxillary sinus lesion, recommend nonemergent MRI face/sinus with and without contrast         I personally discussed impression 1 with Kristen Nazario  at 5/7/23 9:18 PM                       Procedures  ECG 12 Lead Documentation Only    Date/Time: 5/7/2023 8:02 PM  Performed by: Luis Gu DO  Authorized by: Luis Gu DO     Indications / Diagnosis:  Weakness  ECG reviewed by me, the ED Provider: yes    Patient location:  ED  Previous ECG:     Previous ECG:  Unavailable    Comparison to cardiac monitor: Yes    Interpretation:     Interpretation: normal    Rate:     ECG rate:  100bpm    ECG rate assessment: normal    Rhythm:     Rhythm: sinus rhythm    Ectopy:     Ectopy: none    QRS:     QRS axis:  Normal  Conduction:     Conduction: normal    ST segments:     ST segments:  Normal  T waves:     T waves: normal      CriticalCare Time    Date/Time: 5/7/2023 11:24 PM  Performed by: Luis Gu DO  Authorized by: Luis Gu DO     Critical care provider statement:     Critical care time (minutes):  35    Critical care time was exclusive of:  Separately billable procedures and treating other patients and teaching time    Critical care was necessary to treat or prevent imminent or life-threatening deterioration of the following conditions:  CNS failure or compromise    Critical care was time spent personally by me on the following activities:  Blood draw for specimens, obtaining history from patient or surrogate, development of treatment plan with patient or surrogate, discussions with consultants, evaluation of patient's response to treatment, examination of patient, interpretation of cardiac output measurements, ordering and performing treatments and interventions, ordering and review of laboratory studies, ordering and review of radiographic studies and re-evaluation of patient's condition    I assumed direction of critical care for this patient from another provider in my specialty: no               ED Course  ED Course as of 05/07/23 2325   Sun May 07, 2023   2315 Patient reevaluated  Headache has resolved  She is somnolent, but she agrees to be discharged to home    She will call her boyfriend to pick her up  Neurologist agrees that symptoms are likely secondary to an atypical migraine, patient does not require admission to stroke pathway  Stroke Assessment     Row Name 05/07/23 2001             NIH Stroke Scale    Interval Baseline      Level of Consciousness (1a ) 0      LOC Questions (1b ) 0      LOC Commands (1c ) 0      Best Gaze (2 ) 0      Visual (3 ) 0      Facial Palsy (4 ) 0      Motor Arm, Left (5a ) 0      Motor Arm, Right (5b ) 0      Motor Leg, Left (6a ) 0      Motor Leg, Right (6b ) 0      Limb Ataxia (7 ) 1      Sensory (8 ) 0      Best Language (9 ) 0      Dysarthria (10 ) 1      Extinction and Inattention (11 ) (Formerly Neglect) 0      Total 2              Flowsheet Row Most Recent Value   Thrombolytic Decision Options    Thrombolytic Decision Patient not a candidate  Patient is not a candidate options Symptoms resolved/clearly non disabling  Medical Decision Making  64-year-old female presents with headache, dysarthria and vertigo that began approximately 40 minutes prior to arrival   Stroke alert initiated  NIH stroke scale is 2  I reviewed patient with neurology on-call, Dr Sloane Veronica  CTA head and neck ordered and reviewed per stroke protocol  No acute pathology identified  Neuro agrees that symptoms are likely secondary to an atypical migraine and patient should be treated as such  He also recommends that if patient is improved,she may be discharged home and will not require admission to stroke pathway  Patient treated with Decadron and Reglan with resolution of symptoms  Patient is somnolent in the ED, which may be attributed to her use of Xanax  Urine drug screen ordered and reviewed - neg  UA positive for leukocytes, moderate epithelial cells and 30-50 white blood cells with occasional bacteria  I suspect that this is a contaminated specimen we will order a urine culture    Patient is asymptomatic and will follow-up on test results  Amount and/or Complexity of Data Reviewed  Labs: ordered  Radiology: ordered  Risk  Prescription drug management  Disposition  Final diagnoses:   Atypical migraine     Time reflects when diagnosis was documented in both MDM as applicable and the Disposition within this note     Time User Action Codes Description Comment    5/7/2023 11:17 PM Jihan Gutierrez Add [B75 [id number]] Atypical migraine       ED Disposition     ED Disposition   Discharge    Condition   Stable    Date/Time   Sun May 7, 2023 11:16 PM    Comment   Rosa Walker discharge to home/self care  Follow-up Information     Follow up With Specialties Details Why Contact Info Additional Information    Marleen Kearns MD Family Medicine Schedule an appointment as soon as possible for a visit in 1 day for follow up [id number] [address]  [address] [place] [address] Neurology [address] Neu[facility] Schedule an appointment as soon as possible for a visit in 1 day for follow up [address] [phone]  [facility] Neurology [address], [address] , [place], [place], [address] [phone]          Patient's Medications   Discharge Prescriptions    No medications on file       No discharge procedures on file      PDMP Review     None          ED Provider  Electronically Signed by           Danyelle Jolley DO  05/07/23 6190

## 2023-05-09 LAB — BACTERIA UR CULT: ABNORMAL

## 2023-05-10 ENCOUNTER — APPOINTMENT (OUTPATIENT)
Dept: LAB | Facility: CLINIC | Age: 58
End: 2023-05-10

## 2023-05-10 ENCOUNTER — OFFICE VISIT (OUTPATIENT)
Dept: FAMILY MEDICINE CLINIC | Facility: CLINIC | Age: 58
End: 2023-05-10

## 2023-05-10 VITALS
WEIGHT: 186.4 LBS | HEIGHT: 65 IN | DIASTOLIC BLOOD PRESSURE: 80 MMHG | RESPIRATION RATE: 18 BRPM | SYSTOLIC BLOOD PRESSURE: 120 MMHG | BODY MASS INDEX: 31.06 KG/M2 | HEART RATE: 62 BPM | TEMPERATURE: 98.5 F

## 2023-05-10 DIAGNOSIS — I10 BENIGN HYPERTENSION: ICD-10-CM

## 2023-05-10 DIAGNOSIS — G47.10 HYPERSOMNIA: ICD-10-CM

## 2023-05-10 DIAGNOSIS — J34.89 LESION OR MASS OF PARANASAL SINUSES: ICD-10-CM

## 2023-05-10 DIAGNOSIS — R51.9 NONINTRACTABLE HEADACHE, UNSPECIFIED CHRONICITY PATTERN, UNSPECIFIED HEADACHE TYPE: Primary | ICD-10-CM

## 2023-05-10 DIAGNOSIS — Z79.899 HIGH RISK MEDICATION USE: ICD-10-CM

## 2023-05-10 DIAGNOSIS — Z13.29 SCREENING FOR ENDOCRINE, NUTRITIONAL, METABOLIC AND IMMUNITY DISORDER: ICD-10-CM

## 2023-05-10 DIAGNOSIS — E78.5 HYPERLIPIDEMIA, UNSPECIFIED HYPERLIPIDEMIA TYPE: ICD-10-CM

## 2023-05-10 DIAGNOSIS — Z13.228 SCREENING FOR ENDOCRINE, NUTRITIONAL, METABOLIC AND IMMUNITY DISORDER: ICD-10-CM

## 2023-05-10 DIAGNOSIS — Z13.21 SCREENING FOR ENDOCRINE, NUTRITIONAL, METABOLIC AND IMMUNITY DISORDER: ICD-10-CM

## 2023-05-10 DIAGNOSIS — Z13.0 SCREENING FOR ENDOCRINE, NUTRITIONAL, METABOLIC AND IMMUNITY DISORDER: ICD-10-CM

## 2023-05-10 LAB
25(OH)D3 SERPL-MCNC: 24.1 NG/ML (ref 30–100)
ALBUMIN SERPL BCP-MCNC: 3.8 G/DL (ref 3.5–5)
ALP SERPL-CCNC: 89 U/L (ref 46–116)
ALT SERPL W P-5'-P-CCNC: 28 U/L (ref 12–78)
ANION GAP SERPL CALCULATED.3IONS-SCNC: 3 MMOL/L (ref 4–13)
AST SERPL W P-5'-P-CCNC: 15 U/L (ref 5–45)
BASOPHILS # BLD AUTO: 0.08 THOUSANDS/ÂΜL (ref 0–0.1)
BASOPHILS NFR BLD AUTO: 1 % (ref 0–1)
BILIRUB SERPL-MCNC: 0.46 MG/DL (ref 0.2–1)
BUN SERPL-MCNC: 15 MG/DL (ref 5–25)
CALCIUM SERPL-MCNC: 9.6 MG/DL (ref 8.3–10.1)
CHLORIDE SERPL-SCNC: 106 MMOL/L (ref 96–108)
CHOLEST SERPL-MCNC: 250 MG/DL
CO2 SERPL-SCNC: 28 MMOL/L (ref 21–32)
CREAT SERPL-MCNC: 1.15 MG/DL (ref 0.6–1.3)
EOSINOPHIL # BLD AUTO: 0.1 THOUSAND/ÂΜL (ref 0–0.61)
EOSINOPHIL NFR BLD AUTO: 1 % (ref 0–6)
ERYTHROCYTE [DISTWIDTH] IN BLOOD BY AUTOMATED COUNT: 13.7 % (ref 11.6–15.1)
GFR SERPL CREATININE-BSD FRML MDRD: 52 ML/MIN/1.73SQ M
GLUCOSE P FAST SERPL-MCNC: 88 MG/DL (ref 65–99)
HCT VFR BLD AUTO: 46.1 % (ref 34.8–46.1)
HDLC SERPL-MCNC: 45 MG/DL
HGB BLD-MCNC: 14.8 G/DL (ref 11.5–15.4)
IMM GRANULOCYTES # BLD AUTO: 0.04 THOUSAND/UL (ref 0–0.2)
IMM GRANULOCYTES NFR BLD AUTO: 0 % (ref 0–2)
LDLC SERPL CALC-MCNC: 159 MG/DL (ref 0–100)
LYMPHOCYTES # BLD AUTO: 2.43 THOUSANDS/ÂΜL (ref 0.6–4.47)
LYMPHOCYTES NFR BLD AUTO: 25 % (ref 14–44)
MCH RBC QN AUTO: 29 PG (ref 26.8–34.3)
MCHC RBC AUTO-ENTMCNC: 32.1 G/DL (ref 31.4–37.4)
MCV RBC AUTO: 90 FL (ref 82–98)
MONOCYTES # BLD AUTO: 0.55 THOUSAND/ÂΜL (ref 0.17–1.22)
MONOCYTES NFR BLD AUTO: 6 % (ref 4–12)
NEUTROPHILS # BLD AUTO: 6.42 THOUSANDS/ÂΜL (ref 1.85–7.62)
NEUTS SEG NFR BLD AUTO: 67 % (ref 43–75)
NONHDLC SERPL-MCNC: 205 MG/DL
NRBC BLD AUTO-RTO: 0 /100 WBCS
PLATELET # BLD AUTO: 345 THOUSANDS/UL (ref 149–390)
PMV BLD AUTO: 10.6 FL (ref 8.9–12.7)
POTASSIUM SERPL-SCNC: 3.5 MMOL/L (ref 3.5–5.3)
PROT SERPL-MCNC: 7.8 G/DL (ref 6.4–8.4)
RBC # BLD AUTO: 5.1 MILLION/UL (ref 3.81–5.12)
SODIUM SERPL-SCNC: 137 MMOL/L (ref 135–147)
TRIGL SERPL-MCNC: 231 MG/DL
TSH SERPL DL<=0.05 MIU/L-ACNC: 1.82 UIU/ML (ref 0.45–4.5)
VIT B12 SERPL-MCNC: 333 PG/ML (ref 100–900)
WBC # BLD AUTO: 9.62 THOUSAND/UL (ref 4.31–10.16)

## 2023-05-10 RX ORDER — AMOXICILLIN AND CLAVULANATE POTASSIUM 875; 125 MG/1; MG/1
TABLET, FILM COATED ORAL
COMMUNITY
Start: 2023-04-28

## 2023-05-10 NOTE — PROGRESS NOTES
"Chief Complaint   Patient presents with   • Follow-up     ER f/u migraine and dizziness         Patient ID: Kory Templeton is a 59 y o  female  HPI   pt is seeing for f/u ER visit for occular migraine   -  Started to have HA recently -  Up to 7 episodes a month - admits overworking  -  Works double shifts often  -  Normal CTA except for L sinus lesion - recently had L upper jaw dental implant     The following portions of the patient's history were reviewed and updated as appropriate: allergies, current medications, past family history, past medical history, past social history, past surgical history and problem list     Review of Systems   Constitutional: Negative  Respiratory: Negative  Cardiovascular: Negative  Gastrointestinal: Negative  Genitourinary: Negative  Musculoskeletal: Negative  Skin: Negative  Neurological: Positive for headaches  Negative for dizziness, tremors, facial asymmetry, speech difficulty, light-headedness and numbness  Current Outpatient Medications   Medication Sig Dispense Refill   • ALPRAZolam (XANAX) 0 25 mg tablet ONE TAB A DAY AS NEEDED     • amoxicillin-clavulanate (AUGMENTIN) 875-125 mg per tablet TAKE 1 TABLET BY MOUTH TWICE A DAY AFTER MEALS UNTIL FINISHED     • atenolol-chlorthalidone (TENORETIC) 50-25 mg per tablet Take 0 5 tablets by mouth daily 45 tablet 3   • buPROPion (WELLBUTRIN XL) 300 mg 24 hr tablet Take 1 tablet (300 mg total) by mouth every morning 90 tablet 3   • busPIRone (BUSPAR) 5 mg tablet Take 5 mg by mouth daily at bedtime       No current facility-administered medications for this visit  Objective:    /80 (BP Location: Left arm, Patient Position: Sitting, Cuff Size: Large)   Pulse 62   Temp 98 5 °F (36 9 °C)   Resp 18   Ht 5' 5\" (1 651 m)   Wt 84 6 kg (186 lb 6 4 oz)   BMI 31 02 kg/m²        Physical Exam  Constitutional:       General: She is not in acute distress  Appearance: She is not ill-appearing   " Cardiovascular:      Rate and Rhythm: Normal rate and regular rhythm  Neurological:      General: No focal deficit present  Mental Status: She is alert and oriented to person, place, and time  Cranial Nerves: No cranial nerve deficit  Motor: No weakness        Coordination: Coordination normal       Gait: Gait normal    Psychiatric:         Mood and Affect: Mood normal                  Assessment/Plan:         Diagnoses and all orders for this visit:    Nonintractable headache, unspecified chronicity pattern, unspecified headache type    Lesion or mass of paranasal sinuses  -     MRI face wo and w contrast; Future   will send to ENT after test   Other orders  -     amoxicillin-clavulanate (AUGMENTIN) 875-125 mg per tablet; TAKE 1 TABLET BY MOUTH TWICE A DAY AFTER MEALS UNTIL FINISHED          rto prselina Tanner MD

## 2023-05-12 ENCOUNTER — TELEPHONE (OUTPATIENT)
Dept: FAMILY MEDICINE CLINIC | Facility: CLINIC | Age: 58
End: 2023-05-12

## 2023-06-12 DIAGNOSIS — I10 BENIGN HYPERTENSION: ICD-10-CM

## 2023-06-12 RX ORDER — ATENOLOL AND CHLORTHALIDONE TABLET 50; 25 MG/1; MG/1
0.5 TABLET ORAL DAILY
Qty: 45 TABLET | Refills: 3 | Status: SHIPPED | OUTPATIENT
Start: 2023-06-12

## 2023-07-13 ENCOUNTER — OFFICE VISIT (OUTPATIENT)
Dept: FAMILY MEDICINE CLINIC | Facility: CLINIC | Age: 58
End: 2023-07-13
Payer: COMMERCIAL

## 2023-07-13 VITALS
BODY MASS INDEX: 28.64 KG/M2 | DIASTOLIC BLOOD PRESSURE: 82 MMHG | WEIGHT: 178.2 LBS | HEART RATE: 60 BPM | HEIGHT: 66 IN | RESPIRATION RATE: 14 BRPM | TEMPERATURE: 98 F | SYSTOLIC BLOOD PRESSURE: 130 MMHG

## 2023-07-13 DIAGNOSIS — Z12.31 ENCOUNTER FOR SCREENING MAMMOGRAM FOR MALIGNANT NEOPLASM OF BREAST: ICD-10-CM

## 2023-07-13 DIAGNOSIS — I10 BENIGN HYPERTENSION: Primary | ICD-10-CM

## 2023-07-13 PROCEDURE — 99213 OFFICE O/P EST LOW 20 MIN: CPT | Performed by: FAMILY MEDICINE

## 2023-07-13 RX ORDER — LISINOPRIL AND HYDROCHLOROTHIAZIDE 12.5; 1 MG/1; MG/1
1 TABLET ORAL DAILY
Qty: 30 TABLET | Refills: 5 | Status: SHIPPED | OUTPATIENT
Start: 2023-07-13

## 2023-07-13 NOTE — PROGRESS NOTES
Chief Complaint   Patient presents with   • Hypertension     Patient complains of BLOOD PRESSURE spike and headache        Patient ID: Amy Zimmer is a 59 y.o. female. HPI  Pt is seeing for elev BP with occipital HA this am -  Was 160/90 -  Left work -  Taking meds as Rx -  Does not follow strict low Na diet       The following portions of the patient's history were reviewed and updated as appropriate: allergies, current medications, past family history, past medical history, past social history, past surgical history and problem list.    Review of Systems   Constitutional: Negative. Respiratory: Negative. Cardiovascular: Negative. Gastrointestinal: Negative. Genitourinary: Negative. Musculoskeletal: Negative. Skin: Negative. Neurological: Positive for headaches. Negative for dizziness, weakness, light-headedness and numbness. Current Outpatient Medications   Medication Sig Dispense Refill   • atenolol-chlorthalidone (TENORETIC) 50-25 mg per tablet Take 0.5 tablets by mouth daily 45 tablet 3   • buPROPion (WELLBUTRIN XL) 300 mg 24 hr tablet Take 1 tablet (300 mg total) by mouth every morning 90 tablet 3   • busPIRone (BUSPAR) 5 mg tablet Take 5 mg by mouth daily at bedtime     • ALPRAZolam (XANAX) 0.25 mg tablet ONE TAB A DAY AS NEEDED (Patient not taking: Reported on 7/13/2023)       No current facility-administered medications for this visit. Objective:    /82 (BP Location: Left arm, Patient Position: Sitting, Cuff Size: Standard)   Pulse 60   Temp 98 °F (36.7 °C)   Resp 14   Ht 5' 6" (1.676 m)   Wt 80.8 kg (178 lb 3.2 oz)   BMI 28.76 kg/m²        Physical Exam  Constitutional:       Appearance: She is not ill-appearing. Cardiovascular:      Rate and Rhythm: Normal rate and regular rhythm. Heart sounds: No murmur heard. Pulmonary:      Effort: Pulmonary effort is normal. No respiratory distress. Breath sounds: No wheezing, rhonchi or rales. Neurological:      Mental Status: She is alert and oriented to person, place, and time. Psychiatric:         Mood and Affect: Mood normal.           Labs in chart were reviewed. Assessment/Plan:         Diagnoses and all orders for this visit:    Benign hypertension  -    Will change Tenoretic to  lisinopril-hydrochlorothiazide (PRINZIDE,ZESTORETIC) 10-12.5 MG per tablet; Take 1 tablet by mouth daily  BP log at home  Phone f/u in 2 wks   Encounter for screening mammogram for malignant neoplasm of breast  -     Mammo screening bilateral w 3d & cad; Future            BMI Counseling: Body mass index is 28.76 kg/m². Discussed the patient's BMI with her. The BMI is above normal. Nutrition recommendations include reducing portion sizes, decreasing overall calorie intake, 3-5 servings of fruits/vegetables daily, reducing fast food intake and consuming healthier snacks. Exercise recommendations include exercising 3-5 times per week.                  Charisma Mays MD

## 2023-07-13 NOTE — LETTER
July 13, 2023     Patient: Jose Raul José  YOB: 1965  Date of Visit: 7/13/2023      To Whom it May Concern:    Jose Raul José is under my professional care. Concha Coon was seen in my office on 7/13/2023. Concha Coon may return to work on 7/14/23  . If you have any questions or concerns, please don't hesitate to call.          Sincerely,          Jess Medina MD        CC: No Recipients

## 2023-08-01 ENCOUNTER — OFFICE VISIT (OUTPATIENT)
Dept: FAMILY MEDICINE CLINIC | Facility: CLINIC | Age: 58
End: 2023-08-01
Payer: COMMERCIAL

## 2023-08-01 VITALS
RESPIRATION RATE: 16 BRPM | TEMPERATURE: 98.5 F | SYSTOLIC BLOOD PRESSURE: 136 MMHG | WEIGHT: 181.8 LBS | BODY MASS INDEX: 29.22 KG/M2 | HEART RATE: 102 BPM | DIASTOLIC BLOOD PRESSURE: 86 MMHG | HEIGHT: 66 IN

## 2023-08-01 DIAGNOSIS — I10 BENIGN HYPERTENSION: Primary | ICD-10-CM

## 2023-08-01 PROCEDURE — 99213 OFFICE O/P EST LOW 20 MIN: CPT | Performed by: FAMILY MEDICINE

## 2023-08-01 RX ORDER — VALSARTAN AND HYDROCHLOROTHIAZIDE 160; 12.5 MG/1; MG/1
1 TABLET, FILM COATED ORAL DAILY
Qty: 90 TABLET | Refills: 3 | Status: SHIPPED | OUTPATIENT
Start: 2023-08-01

## 2023-08-01 RX ORDER — NITROFURANTOIN 25; 75 MG/1; MG/1
CAPSULE ORAL
COMMUNITY
Start: 2023-07-30

## 2023-08-01 NOTE — PROGRESS NOTES
Chief Complaint   Patient presents with   • Follow-up     Medication f/u    HTN     Patient ID: Virginia Pina is a 59 y.o. female. HPI  Pt is seeing for dry cough developed few days after Lisinopril HCT was started for HTN -  BP improved -  Did not stop med -  No wheezing or SOB     The following portions of the patient's history were reviewed and updated as appropriate: allergies, current medications, past family history, past medical history, past social history, past surgical history and problem list.    Review of Systems   Constitutional: Negative. Respiratory: Positive for cough. Negative for chest tightness, shortness of breath and wheezing. Cardiovascular: Negative. Gastrointestinal: Negative. Genitourinary: Negative. Neurological: Negative. Current Outpatient Medications   Medication Sig Dispense Refill   • buPROPion (WELLBUTRIN XL) 300 mg 24 hr tablet Take 1 tablet (300 mg total) by mouth every morning 90 tablet 3   • lisinopril-hydrochlorothiazide (PRINZIDE,ZESTORETIC) 10-12.5 MG per tablet Take 1 tablet by mouth daily 30 tablet 5   • nitrofurantoin (MACROBID) 100 mg capsule      • ALPRAZolam (XANAX) 0.25 mg tablet ONE TAB A DAY AS NEEDED (Patient not taking: Reported on 7/13/2023)     • busPIRone (BUSPAR) 5 mg tablet Take 5 mg by mouth daily at bedtime (Patient not taking: Reported on 8/1/2023)       No current facility-administered medications for this visit. Objective:    /86 (BP Location: Left arm, Patient Position: Sitting, Cuff Size: Large)   Pulse 102   Temp 98.5 °F (36.9 °C)   Resp 16   Ht 5' 6" (1.676 m)   Wt 82.5 kg (181 lb 12.8 oz)   BMI 29.34 kg/m²        Physical Exam  Constitutional:       General: She is not in acute distress. Appearance: She is not ill-appearing. Cardiovascular:      Rate and Rhythm: Normal rate and regular rhythm. Heart sounds: No murmur heard.   Pulmonary:      Effort: Pulmonary effort is normal. No respiratory distress. Breath sounds: No wheezing, rhonchi or rales. Neurological:      General: No focal deficit present. Mental Status: She is alert and oriented to person, place, and time. Labs in chart were reviewed. Assessment/Plan:         Diagnoses and all orders for this visit:    Benign hypertension  -  Will change Lisinopril -  HCT to    valsartan-hydrochlorothiazide (DIOVAN-HCT) 160-12.5 MG per tablet;  Take 1 tablet by mouth daily    Other orders  -     nitrofurantoin (MACROBID) 100 mg capsule            rto in 3 m               Jaylan Cobian MD

## 2023-08-15 ENCOUNTER — OFFICE VISIT (OUTPATIENT)
Dept: FAMILY MEDICINE CLINIC | Facility: CLINIC | Age: 58
End: 2023-08-15
Payer: COMMERCIAL

## 2023-08-15 VITALS
RESPIRATION RATE: 18 BRPM | HEART RATE: 98 BPM | WEIGHT: 183.8 LBS | BODY MASS INDEX: 29.54 KG/M2 | HEIGHT: 66 IN | TEMPERATURE: 98 F | SYSTOLIC BLOOD PRESSURE: 146 MMHG | DIASTOLIC BLOOD PRESSURE: 92 MMHG

## 2023-08-15 DIAGNOSIS — I10 BENIGN HYPERTENSION: Primary | ICD-10-CM

## 2023-08-15 PROCEDURE — 99213 OFFICE O/P EST LOW 20 MIN: CPT | Performed by: FAMILY MEDICINE

## 2023-08-15 RX ORDER — TRIAMTERENE AND HYDROCHLOROTHIAZIDE 37.5; 25 MG/1; MG/1
1 CAPSULE ORAL DAILY
Qty: 90 CAPSULE | Refills: 3 | Status: SHIPPED | OUTPATIENT
Start: 2023-08-15 | End: 2024-08-09

## 2023-08-15 NOTE — PROGRESS NOTES
Chief Complaint   Patient presents with   • Cough     Patient complains of coughing at night since she started the valsartan-hctz        Patient ID: Virginia Pina is a 59 y.o. female. HPI  Pt is seeing for dry cough at night after started on Valsartan - had cough with Lisinopril     The following portions of the patient's history were reviewed and updated as appropriate: allergies, current medications, past family history, past medical history, past social history, past surgical history and problem list.    Review of Systems   Constitutional: Negative. HENT: Negative. Respiratory: Positive for cough. Negative for chest tightness, shortness of breath and wheezing. Cardiovascular: Negative. Gastrointestinal: Negative. Current Outpatient Medications   Medication Sig Dispense Refill   • buPROPion (WELLBUTRIN XL) 300 mg 24 hr tablet Take 1 tablet (300 mg total) by mouth every morning 90 tablet 3   • valsartan-hydrochlorothiazide (DIOVAN-HCT) 160-12.5 MG per tablet Take 1 tablet by mouth daily 90 tablet 3   • ALPRAZolam (XANAX) 0.25 mg tablet ONE TAB A DAY AS NEEDED (Patient not taking: Reported on 7/13/2023)     • busPIRone (BUSPAR) 5 mg tablet Take 5 mg by mouth daily at bedtime (Patient not taking: Reported on 8/1/2023)     • nitrofurantoin (MACROBID) 100 mg capsule  (Patient not taking: Reported on 8/15/2023)       No current facility-administered medications for this visit. Objective:    /92 (BP Location: Left arm, Patient Position: Sitting, Cuff Size: Large)   Pulse 98   Temp 98 °F (36.7 °C)   Resp 18   Ht 5' 6" (1.676 m)   Wt 83.4 kg (183 lb 12.8 oz)   BMI 29.67 kg/m²        Physical Exam  Constitutional:       General: She is not in acute distress. Appearance: She is not ill-appearing. Cardiovascular:      Rate and Rhythm: Normal rate and regular rhythm. Pulmonary:      Effort: Pulmonary effort is normal. No respiratory distress. Breath sounds:  No wheezing, rhonchi or rales. Musculoskeletal:      Right lower leg: No edema. Left lower leg: No edema. Neurological:      Mental Status: She is alert. Labs in chart were reviewed. Assessment/Plan:         Diagnoses and all orders for this visit:    Benign hypertension  -     triamterene-hydrochlorothiazide (DYAZIDE) 37.5-25 mg per capsule;  Take 1 capsule by mouth daily      will d/c Valsartan HCT    rto in 2 m                     Andrez Quesada MD

## 2023-09-15 ENCOUNTER — TELEPHONE (OUTPATIENT)
Age: 58
End: 2023-09-15

## 2023-09-15 NOTE — TELEPHONE ENCOUNTER
Pt needs to have MMR titers drawn and she may need a tetanus shot as well for a new job. Can you look into these for the patient and call her back and let her know what she will need to do. Thank you.

## 2023-09-18 NOTE — TELEPHONE ENCOUNTER
Called patient. She said she is having labs, vaccine  and PE thru her employer. She will have them, forward report to us.

## 2023-11-27 ENCOUNTER — TELEPHONE (OUTPATIENT)
Dept: NEUROLOGY | Facility: CLINIC | Age: 58
End: 2023-11-27

## 2023-11-27 NOTE — TELEPHONE ENCOUNTER
Unable to leave an voice message mail box is full. Instead I sent a message to my chart asking the patient call back and confirm appt.

## 2024-02-21 PROBLEM — Z12.11 SCREENING FOR COLON CANCER: Status: RESOLVED | Noted: 2018-08-03 | Resolved: 2024-02-21

## 2024-07-09 ENCOUNTER — TELEPHONE (OUTPATIENT)
Age: 59
End: 2024-07-09

## 2024-07-09 NOTE — TELEPHONE ENCOUNTER
Pt called to request to have a copy of all of her lab results, from 2015 to present.  She said she has tried to get them through her MyChart and has had 2 different reps help her, but it is too frustrating.  She is aware there are a lot of results.  Please call her when they are ready and she will come in and pick them up.  [phone]

## (undated) DEVICE — MEDI-VAC YANKAUER SUCTION HANDLE: Brand: CARDINAL HEALTH

## (undated) DEVICE — SINGLE-USE BIOPSY FORCEPS: Brand: RADIAL JAW 4

## (undated) DEVICE — TRAVELKIT CONTAINS FIRST STEP KIT (200ML EP-4 KIT) AND SOILED SCOPE BAG - 1 KIT: Brand: TRAVELKIT CONTAINS FIRST STEP KIT AND SOILED SCOPE BAG

## (undated) DEVICE — LUBRICANT SURGILUBE TUBE 4 OZ  FLIP TOP

## (undated) DEVICE — AIRLIFE™  ADULT CUSHION NASAL CANNULA WITH 7 FOOT (2.1 M) CRUSH-RESISTANT OXYGEN TUBING, AND U/CONNECT-IT ADAPTER: Brand: AIRLIFE™

## (undated) DEVICE — BRUSH CYTOLOGY 3 MM 240 CM

## (undated) DEVICE — BRUSH ENDO CLEANING DBL-HEADER

## (undated) DEVICE — DISPOSABLE BIOPSY VALVE MAJ-1555: Brand: SINGLE USE BIOPSY VALVE (STERILE)

## (undated) DEVICE — GLOVE EXAM NON-STRL NTRL PLUS LRG PF

## (undated) DEVICE — FORCEP ELECSURG RADIAL JAW4 2.2 X 240CM  HOT BX

## (undated) DEVICE — TUBING BUBBLE CLEAR 5MM X 100 FT NS

## (undated) DEVICE — BAG SPECIMEN BIOHAZARD 10 X 10 ADHESIVE

## (undated) DEVICE — Device: Brand: OLYMPUS

## (undated) DEVICE — TUBING AUX CHANNEL

## (undated) DEVICE — TRAP POLY

## (undated) DEVICE — GROUNDING PAD UNIVERSAL SLW

## (undated) DEVICE — MARKER SPOT EX  BOWEL TATTOO SYRINGE

## (undated) DEVICE — GAUZE SPONGES,16 PLY: Brand: CURITY

## (undated) DEVICE — 60 ML SYRINGE,REGULAR TIP: Brand: MONOJECT

## (undated) DEVICE — SOLIDIFIER FLUID WASTE CONTROL 1500ML

## (undated) DEVICE — 1200CC GUARDIAN II: Brand: GUARDIAN

## (undated) DEVICE — SNARE BARBED 230CM